# Patient Record
Sex: MALE | Race: BLACK OR AFRICAN AMERICAN | NOT HISPANIC OR LATINO | Employment: UNEMPLOYED | URBAN - METROPOLITAN AREA
[De-identification: names, ages, dates, MRNs, and addresses within clinical notes are randomized per-mention and may not be internally consistent; named-entity substitution may affect disease eponyms.]

---

## 2022-02-05 ENCOUNTER — HOSPITAL ENCOUNTER (INPATIENT)
Facility: CLINIC | Age: 32
LOS: 6 days | Discharge: HOME | End: 2022-02-14
Attending: EMERGENCY MEDICINE | Admitting: PSYCHIATRY & NEUROLOGY
Payer: COMMERCIAL

## 2022-02-05 DIAGNOSIS — F23 ACUTE PSYCHOSIS (H): ICD-10-CM

## 2022-02-05 DIAGNOSIS — E56.9 VITAMIN DEFICIENCY: Primary | ICD-10-CM

## 2022-02-05 PROCEDURE — 99285 EMERGENCY DEPT VISIT HI MDM: CPT | Mod: 25 | Performed by: EMERGENCY MEDICINE

## 2022-02-05 PROCEDURE — 99285 EMERGENCY DEPT VISIT HI MDM: CPT | Performed by: EMERGENCY MEDICINE

## 2022-02-05 PROCEDURE — 250N000013 HC RX MED GY IP 250 OP 250 PS 637: Performed by: EMERGENCY MEDICINE

## 2022-02-05 RX ORDER — OLANZAPINE 10 MG/1
10 TABLET, ORALLY DISINTEGRATING ORAL ONCE
Status: COMPLETED | OUTPATIENT
Start: 2022-02-05 | End: 2022-02-05

## 2022-02-05 RX ADMIN — OLANZAPINE 10 MG: 10 TABLET, ORALLY DISINTEGRATING ORAL at 17:38

## 2022-02-05 NOTE — ED TRIAGE NOTES
Per EMT report pt was at grocery store pacing and speaking to self about seeing the devil. Pt thinks he is a baby and also GOD. Pt is unable to track conversations.

## 2022-02-05 NOTE — ED PROVIDER NOTES
"ED Provider Note  [facility]      History     Chief Complaint   Patient presents with     Manic Behavior     Pt was at grocery store talking about being possessed by the devil and acting bizzare     HPI  Salvador Macario is a 31 year old male who was brought to the emergency department by EMS from [facility].  EMS got the call that there was an emotionally disturbed person who had been dropped off at a gas station and had been walking around talking to himself for the past hour or 2 and would not leave when staff asked him to leave.  According to EMS, he had stated that he is off of his medications and appeared to be responding to internal stimuli.  He was telling them that ducks and bunnies talk to him and that elves are real.  He began to get agitated when talking about the goddess of Mercy but was redirectable and they did not need to sedate her restrain him.  I am not able to get much history from the patient.  Patient seems very disorganized.  He tells me that he is a baby and that he is the superhero that we will save them all.  He also states that everything is a dream, the hospital is a dream and maybe the hospital is the place for him.  Patient did state to the nurse \"I am schizophrenic and I am off my meds\".    Past Medical History  No past medical history on file.  No past surgical history on file.  No current outpatient medications on file.    Not on File  Family History  No family history on file.  Social History   Social History     Tobacco Use     Smoking status: Not on file     Smokeless tobacco: Not on file   Substance Use Topics     Alcohol use: Not on file     Drug use: Not on file      Past medical history, past surgical history, medications, allergies, family history, and social history were reviewed with the patient. No additional pertinent items.       Review of Systems  A complete review of systems was attempted but limited due to Acute " psychosis.    Physical Exam   BP: (!) 156/89 (a lot of movement)  Pulse: 66  Temp: 97.2  F (36.2  C)  SpO2: 98 %  Physical Exam  Vitals and nursing note reviewed.   Constitutional:       General: He is not in acute distress.     Appearance: He is well-developed. He is not ill-appearing or toxic-appearing.   HENT:      Head: Normocephalic and atraumatic.   Eyes:      General: No scleral icterus.     Pupils: Pupils are equal, round, and reactive to light.   Cardiovascular:      Rate and Rhythm: Normal rate and regular rhythm.   Pulmonary:      Effort: Pulmonary effort is normal. No respiratory distress.   Musculoskeletal:      Cervical back: Normal range of motion and neck supple.   Skin:     General: Skin is warm and dry.      Coloration: Skin is not pale.      Findings: No rash.   Neurological:      Mental Status: He is alert and oriented to person, place, and time.      Sensory: No sensory deficit.   Psychiatric:         Mood and Affect: Affect is inappropriate.         Speech: Speech is tangential.         Behavior: Behavior is hyperactive.         Thought Content: Thought content is delusional.         Judgment: Judgment is impulsive and inappropriate.         ED Course      Procedures                     No results found for any visits on 02/05/22.  Medications   nicotine (NICORETTE) gum 2 mg (has no administration in time range)   nicotine (NICORETTE) gum 4 mg (4 mg Buccal Not Given 2/5/22 1952)   OLANZapine zydis (zyPREXA) ODT tab 10 mg (10 mg Oral Given 2/5/22 1738)        Assessments & Plan (with Medical Decision Making)     This patient presented to the emergency department acutely psychotic.  Thought content is quite tangential and he seems delusional to be responding to internal stimuli.  He is quite disorganized and seems unable to care for himself.  Patient was given 10 mg of Zyprexa orally at presentation and agitation calm and.  Patient then fell asleep and has yet to be assessed by the mental health  .  Plan at this point is for mental health assessment when appropriate.    I have reviewed the nursing notes. I have reviewed the findings, diagnosis, plan and need for follow up with the patient.    New Prescriptions    No medications on file       Final diagnoses:   Acute psychosis (H)       --  Michael MITCHELL [facility] EMERGENCY DEPARTMENT  2/5/2022     Michael Loo MD  02/05/22 5099

## 2022-02-05 NOTE — ED NOTES
Bed: ED14  Expected date: 2/5/22  Expected time: 4:45 PM  Means of arrival: Ambulance  Comments:  Jose 4560, 31yo male, delusional

## 2022-02-05 NOTE — ED NOTES
"Pt is actively responding to internal stimuli, he is having conversations with himself and gesticulating, pacing room. When asked if pt have thoughts of wanting to hurt self, pt replied \"No I have bad thoughts but I want to live\". Pt is not able to track conversations to answer admission questions. When asked about illicit drug use pt said he smokes cigarettes then stated \"I'm schizophrenic and I've been off my medications\". Pt did ate his meal tray and is requesting more snacks. Will offer prn zyprexa and snacks then re-attempt admission questions once pt is able to track for longer periods.   "

## 2022-02-06 ENCOUNTER — TELEPHONE (OUTPATIENT)
Dept: BEHAVIORAL HEALTH | Facility: CLINIC | Age: 32
End: 2022-02-06
Payer: COMMERCIAL

## 2022-02-06 PROCEDURE — 250N000011 HC RX IP 250 OP 636: Performed by: EMERGENCY MEDICINE

## 2022-02-06 PROCEDURE — 250N000013 HC RX MED GY IP 250 OP 250 PS 637: Performed by: EMERGENCY MEDICINE

## 2022-02-06 PROCEDURE — 90791 PSYCH DIAGNOSTIC EVALUATION: CPT

## 2022-02-06 PROCEDURE — 96374 THER/PROPH/DIAG INJ IV PUSH: CPT | Performed by: EMERGENCY MEDICINE

## 2022-02-06 RX ORDER — DIPHENHYDRAMINE HYDROCHLORIDE 50 MG/ML
50 INJECTION INTRAMUSCULAR; INTRAVENOUS ONCE
Status: COMPLETED | OUTPATIENT
Start: 2022-02-06 | End: 2022-02-06

## 2022-02-06 RX ORDER — OLANZAPINE 10 MG/1
10 TABLET, ORALLY DISINTEGRATING ORAL ONCE
Status: COMPLETED | OUTPATIENT
Start: 2022-02-06 | End: 2022-02-06

## 2022-02-06 RX ORDER — ACETAMINOPHEN 500 MG
1000 TABLET ORAL ONCE
Status: COMPLETED | OUTPATIENT
Start: 2022-02-06 | End: 2022-02-06

## 2022-02-06 RX ADMIN — OLANZAPINE 10 MG: 10 TABLET, ORALLY DISINTEGRATING ORAL at 08:36

## 2022-02-06 RX ADMIN — NICOTINE POLACRILEX 2 MG: 2 GUM, CHEWING ORAL at 23:51

## 2022-02-06 RX ADMIN — DIPHENHYDRAMINE HYDROCHLORIDE 50 MG: 50 INJECTION, SOLUTION INTRAMUSCULAR; INTRAVENOUS at 09:15

## 2022-02-06 RX ADMIN — ACETAMINOPHEN 1000 MG: 500 TABLET ORAL at 08:36

## 2022-02-06 RX ADMIN — NICOTINE POLACRILEX 4 MG: 2 GUM, CHEWING ORAL at 08:39

## 2022-02-06 NOTE — PROGRESS NOTES
Salvador Macario is reviewed for [facility] service. Will follow and meet with patient/family/care team as able or requested.     Elise Card, [facility], [facility] Extended [facility]   [phone]

## 2022-02-06 NOTE — SAFE
Salvador Macario  February 6, 2022  SAFE Note    Critical Safety Issues: psychosis      Current Suicidal Ideation/Self-Injurious Concerns/Methods: None - N/A      Current or Historical Inappropriate Sexual Behavior: No      Current or Historical Aggression/Homicidal Ideation: Agitation/Hyperactivity and Impaired Self-Control      Triggers: NA    Updated care team: Yes: MD, RN, intake.    For additional details see full [facility] assessment.       FIOR Vasquez

## 2022-02-06 NOTE — ED NOTES
[facility] ED Mental Health Handoff Note:       Brief HPI:  This is a 31 year old male signed out to me by Dr. Loo.  See initial ED Provider note for full details of the presentation. Patient presenting with disorganized behavior, delusions. Hx schizophrneia. Interval history is pertinent for given olanzapine, now sleeping and awaiting DEC assessment.    Home meds reviewed and ordered/administered: No    Medically stable for inpatient mental health admission: Yes.    Evaluated by mental health: No. Awaiting clinical sobriety before evaluation. clearing sedating effects of olanzapine.     Safety concerns: At the time I received sign out, there were no safety concerns.    Hold Status:  Active Orders   N/A       Exam:   Patient Vitals for the past 24 hrs:   BP Temp Temp src Pulse SpO2   02/05/22 1655 (!) 156/89 97.2  F (36.2  C) Oral 66 98 %       ED Course:    Medications   nicotine (NICORETTE) gum 2 mg (has no administration in time range)   nicotine (NICORETTE) gum 4 mg (4 mg Buccal Not Given 2/5/22 1952)   OLANZapine zydis (zyPREXA) ODT tab 10 mg (10 mg Oral Given 2/5/22 1738)            There were no significant events during my shift.  Patient slept through the night.    Patient was signed out to the oncoming provider, with plan for DEC behavioral health assessment, dispo pending DEC recommendations.      Impression:    ICD-10-CM    1. Acute psychosis (H)  F23        Plan:    1. Awaiting mental health evaluation/recommendations.      RESULTS:   No results found for this visit on 02/05/22 (from the past 24 hour(s)).          MD Jules Pelayo Barrett Parker, MD  02/06/22 0701

## 2022-02-06 NOTE — ED NOTES
Client coming out of room multiple times to ask for food, client responds well to redirection , though client seems increasingly agitated over not having breakfast tray and wanting to smoke. Client verbally responding to internal stimulation, pacing in hallway and room, conversing with himself. Client is currently safe. No aggressive behavior towards self or staff.

## 2022-02-06 NOTE — ED NOTES
2/5/2022  Salvador Macario 1990     [facility] Crisis Assessment    Patient was assessed: in person  Patient location: [facility] Emergency Department       Referral Data and Chief Complaint  Salvador is a 31 year old who uses he/him pronouns. Patient presented to the ED via EMS and was referred to the ED by community provider(s). Patient is presenting to the ED for the following concerns: altered mental state.         Informed Consent and Assessment Methods    Patient is his own guardian. Writer met with patient and explained the crisis assessment process, including applicable information disclosures and limits to confidentiality, assessed understanding of the process, and obtained consent to proceed with the assessment. Patient was observed to be able to participate in the assessment as evidenced by Patient is not able to participate. . Assessment methods included conducting a formal interview with patient, review of medical records, collaboration with medical staff, and obtaining relevant collateral information from family and community providers when available.    Narrative Summary of Presenting Problem and Current Functioning  What led to the patient presenting for crisis services, factors that make the crisis life threatening or complex, stressors, how is this disrupting the patient's life, and how current functioning is in comparison to baseline. How is patient presenting during the assessment.     Per EMT report pt was [place] [employer] pacing and speaking to self about seeing the devil. Pt thinks he is a baby and also GOD. Pt is unable to track conversations. Harlan Peña, RN    Patient is not oriented to self, place or date. His thought process slow and he is unable to answer any questions. He demonstrates thought blocking, responding to internal stimuli and delusional thinking. He admits to having a mental health history and when asked what medications he takes he  "responded \"cigarettes.\" Patient has made statements about being God to others, he is restless, and slightly agitated. He is out of touch with reality. Disorganized. He is not able to participate in any mental health assessment.     History of the Crisis  Duration of the current crisis, coping skills attempted to reduce the crisis, community resources used, and past presentations.    Per chart patient has a history of schizoaffective disorder, bipolar type. [facility] His mother states his medications were changed in December and patient was not able to manage the transition to the new medications.  He had a therapist he saw once in 12/21. He was placed on a stay of commitment 6/21 at [facility] which has closed and per his mother they are no longer involved. His last admission to inpatient mental health appears to be 6/21 at [facility]. Mother reports he has had 7 + mental health admissions in various states, 5 in the last 2 years.     Collateral Information    Patient's mother Asha Maya [phone] was contacted for collateral information. She last spoke to patient yesterday but at that time he did not want to talk to her and hung up. She states she saw he was declining with his mental health and suspects that he has been non compliant with his medications, as this is his pattern. When he is on his medications he does well. She states he does not like to take his medications, thought he reported the last medication change in December he commented that the new medications were better for him, more himself. He has not been attending appointments Since his commitment case management ended she has been trying to help him but become exhausted and he gets defensive saying he can manage everything. Mother reports patient lives alone. He has a hard time functioning, especially in regards to financial management. He gets social security disability and spends it mostly in the " first week of the month, seeking food from her, and racking up debt. He told her he started a job at [employer] last week. Mother would like to see patient court ordered to a living environment where he has better management of his medications and mental health.     Risk Assessment    Risk of Harm to Self     ESS-6   1.a. Over the past 2 weeks, have you had thoughts of killing yourself? Patient unable to answer.   1.b. Have you ever attempted to kill yourself and, if yes, when did this last happen?  Patient unable to answer.   2. Recent or current suicide plan? No per nurses note  3. Recent or current intent to act on ideation? No  4. Lifetime psychiatric hospitalization? Yes  5. Pattern of excessive substance use? No  6. Current irritability, agitation, or aggression? Yes  Scoring note: BOTH 1a and 1b must be yes for it to score 1 point, if both are not yes it is zero. All others are 1 point per number. If all questions 1a/1b - 6 are no, risk is negligible. If one of 1a/1b is yes, then risk is mild. If either question 2 or 3, but not both, is yes, then risk is automatically moderate regardless of total score. If both 2 and 3 are yes, risk is automatically high regardless of total score.     Score: 2, moderate risk    The patient has the following risk factors for suicide: active  or , isolation, lack of support, poor decision making, psychosis, significant behavioral changes and restless/agitated    Is the patient experiencing current suicidal ideation: No    Is the patient engaging in preparatory suicide behaviors (formulating how to act on plan, giving away possessions, saying goodbye, displaying dramatic behavior changes, etc)? No    Does the patient have access to firearms or other lethal means? Unknown. Patient unable to answer.     The patient has the following protective factors: displays resiliency , established relationship community mental health provider(s) and safe/stable housing    Support  system information: Mother. [facility] staff    Patient strengths: Patient does well when on medications per his mother. He has attended college for 7-8 years and reports he has one class to completed.     Does the patient engage in non-suicidal self-injurious behavior (NSSI/SIB)? no    Is the patient vulnerable to sexual exploitation?  No    Is the patient experiencing abuse or neglect? no    Is the patient a vulnerable adult? No      Risk of Harm to Others  The patient has the following risk factors of harm to others: no risk factors identified    Does the patient have thoughts of harming others? No    Is the patient engaging in sexually inappropriate behavior?  no       Current Substance Abuse    Is there recent substance abuse? no Patient unable to answer.     Was a urine drug screen or blood alcohol level obtained: Yes pending    Current Symptoms/Concerns    Symptoms  Attention, hyperactivity, and impulsivity symptoms present: Yes: Disorganized/Forgetful and Restless    Anxiety symptoms present: No      Appetite symptoms present: No     Behavioral difficulties present: Yes: Agitation and Wandering     Cognitive impairment symptoms present: No    Depressive symptoms present: Patient unable to answer.     Eating disorder symptoms present: Patient unable to answer.     Learning disabilities, cognitive challenges, and/or developmental disorder symptoms present: No     Manic/hypomanic symptoms present: Yes Increased irritability/agitation    Personality and interpersonal functioning difficulties present : No    Psychosis symptoms present: Yes: Hallucinations: Auditory and Visual, Delusions: Ideas of Reference: being God and a baby. He sees the devil. and Paranoid: NA and Grossly Disorganized Speech      Sleep difficulties present: No    Substance abuse disorder symptoms present: No     Trauma and stressor related symptoms present: No           Mental Status Exam   Affect: Blunted and Labile   Appearance: Appropriate     Attention Span/Concentration: Inattentive?    Eye Contact: Intense and Variable   Fund of Knowledge: Delayed    Language /Speech Content: Fluent   Language /Speech Volume: Normal    Language /Speech Rate/Productions: Minimally Responsive    Recent Memory: Poor   Remote Memory: Poor   Mood: Anxious and Irritable    Orientation to Person: No    Orientation to Place: No   Orientation to Time of Day: No    Orientation to Date: No    Situation (Do they understand why they are here?): No    Psychomotor Behavior: Agitated    Thought Content: Delusions and Hallucinations   Thought Form: Other: thought blocking       Mental Health and Substance Abuse History    History  Current and historical diagnoses or mental health concerns: Schizoaffective disorder, bipolar type. Generalized anxiety disorder. Cannabis use disorder. Alcohol use disorder.     Prior MH services (inpatient, programmatic care, outpatient, etc) : Yes His mother reports he has had 7+ prior mental health admissions in [facility], [place], [facility] [place]. The most recent was [facility] 6/2021. [facility] He does not have any other providers at this time.     History of substance abuse: Alcohol and marijuana.     Prior ELICIA services (inpatient, programmatic care, detox, outpatient, etc) : Patient unable to answer.     History of commitment: Yes Stay of commitment 6/23/2021. Closed.    Family history of MH/ELICIA:  Per [facility]   Sister with bipolar  Maternal great-grandmother: schizophrenia  Mother: undiagnosed  Biological family members with suicide: none  Biological family members with substance abuse: none       Trauma history: Yes Patient was in the  service deployed in [place]. He was honorably discharged in 2013 due to mental illness. [facility]     Medication  Psychotropic medications: No    Current Care Team  [facility] Provider: [facility]      Psychiatrist: Dr. El, [facility]. [phone].      Release of Information  Was a release of information signed: No. Reason: psychosis.      Biopsychosocial Information    Socioeconomic Information  Current living situation: Patient lives alone.     Employment/income source: Patient is on social security disability. 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     Relevant legal issues: None    Cultural, Anglican, or spiritual influences on mental health care: NA    Is the patient active in the  or a : Yes, patient is connected with  specific resources      Relevant Medical Concerns   Patient identifies concerns with completing ADLs? No     Patient can ambulate independently? Yes     Other medical concerns? No     History of concussion or TBI? No        Diagnosis    295.70  (F25) Schizoaffective Disorder Bipolar Type - by history     Therapeutic Intervention  The following therapeutic methodologies were employed when working with the patient: establishing rapport, assessing dimensions of crisis and identifying additional supports and alternative coping skills.       Disposition  Recommended disposition: Inpatient Mental Health      Reviewed case and recommendations with attending provider. Attending Name: Dr. Appiah      Attending concurs with disposition: Yes      Patient concurs with disposition:   Patient unable to answer.     Guardian concurs with disposition: NA     Final disposition: Inpatient mental health .     Inpatient Details (if applicable):  Is patient admitted voluntarily:No, 72 hr hold. Hold start date/time: 2/6/2022 8:11 am    Patient aware of potential for transfer if there is not appropriate placement? NA     [facility]? NA      Behavioral Intake Notified? Yes: Date: 2/6/22 Time: 8:30 am      Clinical Substantiation of Recommendations   Rationale with supporting factors for  disposition and diagnosis.     Patient presents to the ED with symptoms consistent with his schizoaffective disorder, bipolar type. He is having delusions, thought blocking, loss touch of reality, restlessness, disorganized thoughts, and inability to participate in any assessment. His symptoms are consistent with what his mother describes happens when he stops taking his medications. He is at risk of harm to himself and will be admitted to inpatient mental health on a 72 hour hold.        Assessment Details  Patient interview started at: 8:10 and completed at: 8:40.    Total duration spent on the patient case in minutes: 1.0 hrs     CPT code(s) utilized: 84530 - Psychotherapy for Crisis - 60 (30-74*) min     FIOR Vasquez

## 2022-02-06 NOTE — ED NOTES
"Writer re-approached pt to complete admission but pt was becoming agitated and stated \"I don't like to look into people eyes,stop talking\"   "

## 2022-02-06 NOTE — ED NOTES
9:00 pt was pacing smith and redirected to walk in short smith by 16a/b.    Pt make fist-closed punch/ fighting gestures to staff and exit door    Pt redirected to room, chair removed from room to allow more walking space within room    Pt provided with warm blanket and water

## 2022-02-06 NOTE — ED NOTES
Psych Assoc. (writer) rounded on pt at 8am, provided juice, banana, water and distraction activities (coloring book) and TV remote.    Pt stated he does not know what his care plan is..    Psych Assoc (writer) informed nurse the pt would like to know his care plan.    At 8:10 Nurse provided a sandwich and milk    At 8:15  Pt escalated and threatened to punch Psych Assciate    At 8:20 Pt is pacing in smith outside smith claiming he's god and giving everyone supernatural etienne.    8:30 Psych Assoc.(writer) and nurse able to redirect pt to room.

## 2022-02-06 NOTE — ED NOTES
SIGN-OUT:  - Assumed care of this patient from Dr. Vicente  - Pending at shift change: BEC assessment  - Tentative plan from original EM Physician: Needs BEC Assessment, likely admit    UPDATES / REASSESSMENT:    - Reassessment:   --On reassessment patient was cooperative but a poor historian given his underlying mental health.  Patient was commenting about how he was already dead, how he was God, how he was a baby.  Patient had thought blocking, was not responding to internal stimuli.  Unable to obtain an accurate history.  Per chart review patient has previously been under commitment for psychosis, as recently as last June.  Collaborative history was obtained through the medical records and from his family.  Please see BEC assessment from today for full details.  Patient was denying any medical complaints, had stable vital signs, had a grossly normal physical neurologic exam on my reassessment.    DISCUSSIONS:  - w/ BEC .  Patient was felt to be acutely psychotic and a danger to self unable to make clear or safe decisions.  Patient was placed on a 72-hour hold and will be admitted to the mental health unit.  Patient at times appeared somewhat agitated but could be verbally redirected.  Did give 10 of oral Zyprexa.  No need for chemical restraints, seclusion or physical restraints while in the emergency department.  He is still awaiting mental health placement.  He was signed out to Dr. Rodas who will follow up until a bed is available.    DISPOSITION:  - IMPRESSION: Psychosis   - DISPOSITION: admission to inpatient on a 72-hour hold      MD Bijal Howell, Norris Mullins MD  02/08/22 0982

## 2022-02-07 ENCOUNTER — TELEPHONE (OUTPATIENT)
Dept: BEHAVIORAL HEALTH | Facility: CLINIC | Age: 32
End: 2022-02-07

## 2022-02-07 LAB
AMPHETAMINES UR QL SCN: NORMAL
BARBITURATES UR QL: NORMAL
BENZODIAZ UR QL: NORMAL
CANNABINOIDS UR QL SCN: NORMAL
COCAINE UR QL: NORMAL
OPIATES UR QL SCN: NORMAL
SARS-COV-2 RNA RESP QL NAA+PROBE: NEGATIVE

## 2022-02-07 PROCEDURE — 250N000013 HC RX MED GY IP 250 OP 250 PS 637: Performed by: EMERGENCY MEDICINE

## 2022-02-07 PROCEDURE — U0003 INFECTIOUS AGENT DETECTION BY NUCLEIC ACID (DNA OR RNA); SEVERE ACUTE RESPIRATORY SYNDROME CORONAVIRUS 2 (SARS-COV-2) (CORONAVIRUS DISEASE [COVID-19]), AMPLIFIED PROBE TECHNIQUE, MAKING USE OF HIGH THROUGHPUT TECHNOLOGIES AS DESCRIBED BY CMS-2020-01-R: HCPCS | Performed by: EMERGENCY MEDICINE

## 2022-02-07 PROCEDURE — 80307 DRUG TEST PRSMV CHEM ANLYZR: CPT | Performed by: EMERGENCY MEDICINE

## 2022-02-07 RX ORDER — OLANZAPINE 10 MG/1
10 TABLET, ORALLY DISINTEGRATING ORAL ONCE
Status: COMPLETED | OUTPATIENT
Start: 2022-02-07 | End: 2022-02-07

## 2022-02-07 RX ADMIN — NICOTINE POLACRILEX 2 MG: 2 GUM, CHEWING ORAL at 15:38

## 2022-02-07 RX ADMIN — NICOTINE POLACRILEX 2 MG: 2 GUM, CHEWING ORAL at 17:54

## 2022-02-07 RX ADMIN — NICOTINE POLACRILEX 2 MG: 2 GUM, CHEWING ORAL at 06:00

## 2022-02-07 RX ADMIN — NICOTINE POLACRILEX 2 MG: 2 GUM, CHEWING ORAL at 13:24

## 2022-02-07 RX ADMIN — OLANZAPINE 10 MG: 10 TABLET, ORALLY DISINTEGRATING ORAL at 07:03

## 2022-02-07 NOTE — ED NOTES
Patient reports it is okay to talk to his mom. His mom would like to be notified when patient gets admitted, patient is his own guardian, would have to okay this with the patient when the patient transfers. Mom is Asha [phone]

## 2022-02-07 NOTE — ED NOTES
Patient having increased restlessness and difficulty staying in room. Patient remains pleasant, but has difficulty organizing thoughts.

## 2022-02-07 NOTE — ED NOTES
[facility] ED Mental Health Handoff Note:       Brief HPI:  This is a 31 year old male signed out to me by Dr. Aquino.  See initial ED Provider note for full details of the presentation. Interval history is pertinent for no acute issues.    Home meds reviewed and ordered/administered: Yes    Medically stable for inpatient mental health admission: Yes.    Evaluated by mental health: Yes. The recommendation is for inpatient mental health treatment. Bed search in process    Safety concerns: At the time I received sign out, there were no safety concerns.    Hold Status:  Active Orders   Legal    Emergency Hospitalization Hold (72 Hr Hold)     Frequency: Effective Now     Start Date/Time: 02/06/22 0812      Number of Occurrences: Until Specified            Exam:   Patient Vitals for the past 24 hrs:   BP Temp Temp src Pulse Resp SpO2   02/07/22 0730 122/74 98.7  F (37.1  C) Oral 82 18 97 %   02/07/22 0611 (!) 141/90 98.4  F (36.9  C) Oral 102 16 97 %   02/06/22 1821 131/69 98.6  F (37  C) Oral 69 16 94 %           ED Course:    Medications   nicotine (NICORETTE) gum 2 mg (2 mg Buccal Given 2/7/22 1324)   OLANZapine zydis (zyPREXA) ODT tab 10 mg (10 mg Oral Given 2/5/22 1738)   nicotine (NICORETTE) gum 4 mg (4 mg Buccal Given 2/6/22 0839)   OLANZapine zydis (zyPREXA) ODT tab 10 mg (10 mg Oral Given 2/6/22 0836)   acetaminophen (TYLENOL) tablet 1,000 mg (1,000 mg Oral Given 2/6/22 0836)   diphenhydrAMINE (BENADRYL) injection 50 mg (50 mg Intravenous Given 2/6/22 0915)   OLANZapine zydis (zyPREXA) ODT tab 10 mg (10 mg Oral Given 2/7/22 0703)            There were no significant events during my shift.    Patient was signed out to the oncoming provider, Dr. Lraa      Impression:    ICD-10-CM    1. Acute psychosis (H)  F23        Plan:    1. Awaiting inpatient mental health admission/transfer.      RESULTS:   Results for orders placed or performed during the hospital encounter of 02/05/22 (from the past 24  hour(s))   Urine Drugs of Abuse Screen     Status: Normal    Collection Time: 02/07/22  6:02 AM    Narrative    The following orders were created for panel order Urine Drugs of Abuse Screen.  Procedure                               Abnormality         Status                     ---------                               -----------         ------                     Drug abuse screen 1 urin...[[id number]]  Normal              Final result                 Please view results for these tests on the individual orders.   Drug abuse screen 1 urine (ED)     Status: Normal    Collection Time: 02/07/22  6:02 AM   Result Value Ref Range    Amphetamines Urine Screen Negative Screen Negative    Barbiturates Urine Screen Negative Screen Negative    Benzodiazepines Urine Screen Negative Screen Negative    Cannabinoids Urine Screen Negative Screen Negative    Cocaine Urine Screen Negative Screen Negative    Opiates Urine Screen Negative Screen Negative   Asymptomatic COVID-19 Virus (Coronavirus) by PCR Nasopharyngeal     Status: Normal    Collection Time: 02/07/22  6:09 AM    Specimen: Nasopharyngeal; Swab   Result Value Ref Range    SARS CoV2 PCR Negative Negative    Narrative    Testing was performed using the miri  SARS-CoV-2 & Influenza A/B Assay on the miri  Inés  System.  This test should be ordered for the detection of SARS-COV-2 in individuals who meet SARS-CoV-2 clinical and/or epidemiological criteria. Test performance is unknown in asymptomatic patients.  This test is for in vitro diagnostic use under the FDA EUA for laboratories certified under CLIA to perform moderate and/or high complexity testing. This test has not been FDA cleared or approved.  A negative test does not rule out the presence of PCR inhibitors in the specimen or target RNA in concentration below the limit of detection for the assay. The possibility of a false negative should be considered if the patient's recent exposure or clinical presentation  suggests COVID-19.  [facility] are certified under the Clinical Laboratory Improvement Amendments of 1988 (CLIA-88) as qualified to perform moderate and/or high complexity laboratory testing.             Kristina Watts MD    Signed:  Kristina Watts MD  February 7, 2022 at 2:17 PM                     Kristina Watts MD  02/07/22 1417

## 2022-02-07 NOTE — TELEPHONE ENCOUNTER
9348 Bed Search Update:    0333 Pt is a .  [facility] after [facility] has a negative COVID and resulted drug screen.  Once labs have resulted, Clinical will be faxed to [phone].  ED Charge notified about labs via text page at 4063. 7584 Clinical and labs have been [facility] to the [facility] for review.  Case will be passed to days to track.

## 2022-02-07 NOTE — TELEPHONE ENCOUNTER
R:  10 AM  Per [facility], they have no available beds for patient.  He will remain on the worklist until placement found.

## 2022-02-07 NOTE — ED NOTES
Patient sleeping at this time. Prior nurse reports patient refusing Covid test and UA. Writer with re approach when patient is awake.

## 2022-02-07 NOTE — ED NOTES
Pt is currently boarding in the ED.  Pt was offered hygiene supplies: Yes. Patient currently performing ADL's   Pt was offered to ambulate on unit with staff: Yes.  Meal tray ordered for pt: Yes.

## 2022-02-07 NOTE — ED NOTES
"Triage & Transition Services     [facility] Crisis Reassessment    Salvador Macario was reassessed for the following reasons: boarding in ED for 46+ hours, awaiting inpatient admission. Patient presented on 2022 with concerns for altered mental status. He was assessed by SUSANNE Cook on 2022 with recommendations for inpatient admission. Please see the initial assessment note for additional details. He is currently on a 72 HH which will  2/10/2022 at 0001.     Initial ED presentation details: Patient presented via EMS and was disoriented, and exhibiting psychosis.     Current patient presentation: Patient was asleep during writer's first attempt at reassessment, but woke up shortly thereafter, and agreed to meeting with writer. He stated that he did not know where he was, but was able to identify himself and provide the correct date (2022). He believes that he was found by a gas station, likely \"acting erratically\" and was brought to the ED. He then asked if he hurt anyone. He reported that he stopped taking his Lamictal about a week ago for no particular reason. He states that he feels suicidal \"all the time,\" but denies any active thoughts. He endorsed history of suicide attempts, but could not recall the details. He denied homicidal ideation. He provided an ambiguous response to whether or not he is experiencing auditory or visual hallucinations, though did not appear to be responding to internal stimuli. He denied drug or alcohol use. He states that he doesn't want to be a failure, and feels like he is failing everything. He states that he is \"not right in the head,\" and says that he would like to be in the hospital. He states that he is at risk of seizures due to discontinuation of Lamictal.     Changes observed since initial assessment: Now oriented to self, situation and date, able to participate in reassessment, improvement noted to initial psychotic symptoms.     Risk of " Harm  Current suicidal ideation: No    Thoughts of harming others: No    Mental Status Exam   Appearance: alert, somnolent and cooperative  Attitude: cooperative  Eye Contact: good  Mood: indifferent  Affect: intensity is flat and fixed mobility  Speech: clear, coherent  Psychomotor Behavior: no evidence of tardive dyskinesia, dystonia, or tics  Thought Process:  logical  Associations: no loose associations  Thought Content: no evidence of suicidal ideation or homicidal ideation and no evidence of psychotic thought  Insight: fair  Judgement: fair  Oriented to: time, person, situation  Attention Span and Concentration: fair  Recent and Remote Memory: fair    Additional Collateral Information   None obtained, though bedside RN had a lengthy conversation with mother, who expressed desire for civil commitment due to patient's history of noncompliance resulting in choices that put him at risk of harm.     Therapeutic Intervention  The following therapeutic methodologies were employed when working with the patient: Establishing rapport, Active listening and Assess dimensions of crisis. Patient response to intervention: open, engaged.    Disposition  Recommended disposition: Inpatient Mental Health      Reviewed case and recommendations with attending provider: Dr. Acevedo      Attending concurs with disposition: Yes      Patient concurs with disposition: Yes      Final disposition: Inpatient mental health .     Clinical Substantiation of Recommendations  Patient reports that he has been off his Lamictal for more than a week leading to decompensation. He is requesting psychiatric admission for safety and stabilization purposes. He continues to exhibit altered mental state and would be unable to care for himself in the community in his current state.     Assessment Details  Total duration spent on the patient case in minutes: .50 hrs     CPT code(s) utilized: 36826 - Psychotherapy (with patient) - 30 (16-37*) min     April  Waldemar, CLAIRSW

## 2022-02-08 PROBLEM — F23 ACUTE PSYCHOSIS (H): Status: ACTIVE | Noted: 2022-02-08

## 2022-02-08 LAB
ALBUMIN SERPL-MCNC: 3.4 G/DL (ref 3.4–5)
ALP SERPL-CCNC: 44 U/L (ref 40–150)
ALT SERPL W P-5'-P-CCNC: 41 U/L (ref 0–70)
ANION GAP SERPL CALCULATED.3IONS-SCNC: 4 MMOL/L (ref 3–14)
AST SERPL W P-5'-P-CCNC: 16 U/L (ref 0–45)
BASOPHILS # BLD AUTO: 0.1 10E3/UL (ref 0–0.2)
BASOPHILS NFR BLD AUTO: 1 %
BILIRUB SERPL-MCNC: 0.3 MG/DL (ref 0.2–1.3)
BUN SERPL-MCNC: 19 MG/DL (ref 7–30)
CALCIUM SERPL-MCNC: 8.8 MG/DL (ref 8.5–10.1)
CHLORIDE BLD-SCNC: 108 MMOL/L (ref 94–109)
CHOLEST SERPL-MCNC: 161 MG/DL
CO2 SERPL-SCNC: 28 MMOL/L (ref 20–32)
CREAT SERPL-MCNC: 0.79 MG/DL (ref 0.66–1.25)
EOSINOPHIL # BLD AUTO: 0.5 10E3/UL (ref 0–0.7)
EOSINOPHIL NFR BLD AUTO: 9 %
ERYTHROCYTE [DISTWIDTH] IN BLOOD BY AUTOMATED COUNT: 11.3 % (ref 10–15)
FOLATE SERPL-MCNC: 16.7 NG/ML
GFR SERPL CREATININE-BSD FRML MDRD: >90 ML/MIN/1.73M2
GLUCOSE BLD-MCNC: 98 MG/DL (ref 70–99)
HCT VFR BLD AUTO: 41.2 % (ref 40–53)
HDLC SERPL-MCNC: 40 MG/DL
HGB BLD-MCNC: 13.4 G/DL (ref 13.3–17.7)
IMM GRANULOCYTES # BLD: 0 10E3/UL
IMM GRANULOCYTES NFR BLD: 0 %
LDLC SERPL CALC-MCNC: 107 MG/DL
LYMPHOCYTES # BLD AUTO: 1.9 10E3/UL (ref 0.8–5.3)
LYMPHOCYTES NFR BLD AUTO: 32 %
MCH RBC QN AUTO: 29.8 PG (ref 26.5–33)
MCHC RBC AUTO-ENTMCNC: 32.5 G/DL (ref 31.5–36.5)
MCV RBC AUTO: 92 FL (ref 78–100)
MONOCYTES # BLD AUTO: 0.5 10E3/UL (ref 0–1.3)
MONOCYTES NFR BLD AUTO: 8 %
NEUTROPHILS # BLD AUTO: 3 10E3/UL (ref 1.6–8.3)
NEUTROPHILS NFR BLD AUTO: 50 %
NONHDLC SERPL-MCNC: 121 MG/DL
NRBC # BLD AUTO: 0 10E3/UL
NRBC BLD AUTO-RTO: 0 /100
PLATELET # BLD AUTO: 319 10E3/UL (ref 150–450)
POTASSIUM BLD-SCNC: 4.2 MMOL/L (ref 3.4–5.3)
PROT SERPL-MCNC: 6.8 G/DL (ref 6.8–8.8)
RBC # BLD AUTO: 4.5 10E6/UL (ref 4.4–5.9)
SODIUM SERPL-SCNC: 140 MMOL/L (ref 133–144)
T4 FREE SERPL-MCNC: 0.79 NG/DL (ref 0.76–1.46)
TRIGL SERPL-MCNC: 68 MG/DL
TSH SERPL DL<=0.005 MIU/L-ACNC: 0.38 MU/L (ref 0.4–4)
VIT B12 SERPL-MCNC: 532 PG/ML (ref 193–986)
WBC # BLD AUTO: 5.9 10E3/UL (ref 4–11)

## 2022-02-08 PROCEDURE — 124N000002 HC R&B MH UMMC

## 2022-02-08 PROCEDURE — 85014 HEMATOCRIT: CPT | Performed by: STUDENT IN AN ORGANIZED HEALTH CARE EDUCATION/TRAINING PROGRAM

## 2022-02-08 PROCEDURE — 84439 ASSAY OF FREE THYROXINE: CPT | Performed by: STUDENT IN AN ORGANIZED HEALTH CARE EDUCATION/TRAINING PROGRAM

## 2022-02-08 PROCEDURE — 80061 LIPID PANEL: CPT | Performed by: STUDENT IN AN ORGANIZED HEALTH CARE EDUCATION/TRAINING PROGRAM

## 2022-02-08 PROCEDURE — 250N000013 HC RX MED GY IP 250 OP 250 PS 637: Performed by: STUDENT IN AN ORGANIZED HEALTH CARE EDUCATION/TRAINING PROGRAM

## 2022-02-08 PROCEDURE — 36415 COLL VENOUS BLD VENIPUNCTURE: CPT | Performed by: STUDENT IN AN ORGANIZED HEALTH CARE EDUCATION/TRAINING PROGRAM

## 2022-02-08 PROCEDURE — 82746 ASSAY OF FOLIC ACID SERUM: CPT | Performed by: STUDENT IN AN ORGANIZED HEALTH CARE EDUCATION/TRAINING PROGRAM

## 2022-02-08 PROCEDURE — 80053 COMPREHEN METABOLIC PANEL: CPT | Performed by: STUDENT IN AN ORGANIZED HEALTH CARE EDUCATION/TRAINING PROGRAM

## 2022-02-08 PROCEDURE — 82607 VITAMIN B-12: CPT | Performed by: STUDENT IN AN ORGANIZED HEALTH CARE EDUCATION/TRAINING PROGRAM

## 2022-02-08 PROCEDURE — 99223 1ST HOSP IP/OBS HIGH 75: CPT | Mod: AI | Performed by: PSYCHIATRY & NEUROLOGY

## 2022-02-08 PROCEDURE — 84443 ASSAY THYROID STIM HORMONE: CPT | Performed by: STUDENT IN AN ORGANIZED HEALTH CARE EDUCATION/TRAINING PROGRAM

## 2022-02-08 PROCEDURE — 250N000013 HC RX MED GY IP 250 OP 250 PS 637: Performed by: EMERGENCY MEDICINE

## 2022-02-08 RX ORDER — OLANZAPINE 10 MG/2ML
10 INJECTION, POWDER, FOR SOLUTION INTRAMUSCULAR 3 TIMES DAILY PRN
Status: DISCONTINUED | OUTPATIENT
Start: 2022-02-08 | End: 2022-02-14 | Stop reason: HOSPADM

## 2022-02-08 RX ORDER — OLANZAPINE 10 MG/1
10 TABLET ORAL 3 TIMES DAILY PRN
Status: DISCONTINUED | OUTPATIENT
Start: 2022-02-08 | End: 2022-02-14 | Stop reason: HOSPADM

## 2022-02-08 RX ORDER — ACETAMINOPHEN 325 MG/1
650 TABLET ORAL EVERY 4 HOURS PRN
Status: DISCONTINUED | OUTPATIENT
Start: 2022-02-08 | End: 2022-02-14 | Stop reason: HOSPADM

## 2022-02-08 RX ORDER — VITAMIN B COMPLEX
25 TABLET ORAL DAILY
Status: DISCONTINUED | OUTPATIENT
Start: 2022-02-08 | End: 2022-02-14 | Stop reason: HOSPADM

## 2022-02-08 RX ORDER — HYDROXYZINE HYDROCHLORIDE 25 MG/1
25 TABLET, FILM COATED ORAL EVERY 4 HOURS PRN
Status: DISCONTINUED | OUTPATIENT
Start: 2022-02-08 | End: 2022-02-14 | Stop reason: HOSPADM

## 2022-02-08 RX ORDER — POLYETHYLENE GLYCOL 3350 17 G/17G
17 POWDER, FOR SOLUTION ORAL DAILY PRN
Status: DISCONTINUED | OUTPATIENT
Start: 2022-02-08 | End: 2022-02-14 | Stop reason: HOSPADM

## 2022-02-08 RX ORDER — OLANZAPINE 10 MG/1
10 TABLET ORAL DAILY
Status: DISCONTINUED | OUTPATIENT
Start: 2022-02-08 | End: 2022-02-09

## 2022-02-08 RX ORDER — TRAZODONE HYDROCHLORIDE 50 MG/1
50 TABLET, FILM COATED ORAL
Status: DISCONTINUED | OUTPATIENT
Start: 2022-02-08 | End: 2022-02-14 | Stop reason: HOSPADM

## 2022-02-08 RX ADMIN — Medication 25 MCG: at 08:29

## 2022-02-08 RX ADMIN — NICOTINE POLACRILEX 2 MG: 2 GUM, CHEWING ORAL at 12:26

## 2022-02-08 RX ADMIN — NICOTINE POLACRILEX 2 MG: 2 GUM, CHEWING ORAL at 11:06

## 2022-02-08 RX ADMIN — OLANZAPINE 10 MG: 10 TABLET, FILM COATED ORAL at 11:46

## 2022-02-08 ASSESSMENT — ACTIVITIES OF DAILY LIVING (ADL)
LAUNDRY: UNABLE TO COMPLETE
HYGIENE/GROOMING: INDEPENDENT
ORAL_HYGIENE: INDEPENDENT
DRESS: SCRUBS (BEHAVIORAL HEALTH);PROMPTS

## 2022-02-08 NOTE — PLAN OF CARE
Pt slept for a total of 2.5 hours during this overnight shift. No PRN medications were given and no concerns were noted.     Problem: Adult Inpatient Plan of Care  Goal: Readiness for Transition of Care  Intervention: Mutually Develop Transition Plan  Recent Flowsheet Documentation  Taken 2/8/2022 0152 by Maci Mixon  Equipment Currently Used at Home: none

## 2022-02-08 NOTE — PLAN OF CARE
Pt denies SI.  Pt asked if hearing voices.  Pt paused and did not answer.  Pt states he feels like living in a fantasy world and not a good one.  Pt does well when he's taking medication.  Pt enc to go to a group and pt walked in.  Staff told pt that he does not need to participate instead can sit and look out window.  Pt did not stay and participate in any groups except for community meeting. Pt paced halls majority of the day. Pt had limited responses to staff when asked to do something or asked questions. Pt required multiple promptings when asked questions. Pt slept through afternoon groups. Pt's mom called and asked about well being and asked if he was responding to staff. Staff relayed number to pt, but pt did not call them back.   Problem: Adult Inpatient Plan of Care  Goal: Plan of Care Review  Outcome: No Change  Goal: Patient-Specific Goal (Individualized)  Outcome: No Change  Goal: Absence of Hospital-Acquired Illness or Injury  Outcome: No Change  Goal: Optimal Comfort and Wellbeing  Outcome: No Change  Goal: Readiness for Transition of Care  Outcome: No Change     Problem: Behavioral Health Plan of Care  Goal: Plan of Care Review  Outcome: No Change  Goal: Patient-Specific Goal (Individualization)  Outcome: No Change  Note:     Goal: Adheres to Safety Considerations for Self and Others  Outcome: No Change  Goal: Absence of New-Onset Illness or Injury  Outcome: No Change  Goal: Optimized Coping Skills in Response to Life Stressors  Outcome: No Change  Goal: Develops/Participates in Therapeutic [place] to Support Successful Transition  Outcome: No Change

## 2022-02-08 NOTE — PROGRESS NOTES
02/08/22 0151   Patient Belongings   Did you bring any home meds/supplements to the hospital?  No   Patient Belongings locker;sent to security per site process   Patient Belongings Put in Hospital Secure Location (Security or Locker, etc.) cash/credit card;cell phone/electronics;clothing;keys;shoes;wallet   Belongings Search Yes   Clothing Search Yes   Second Staff Trell BARNES   IN LOCKER:  Shoes  Hat  Jacket  Sweatshirt  Black socks  Black shorts  Iphone w/case  Gold colored watch  Keys on lanyard  Lighter  Open pack of cigarettes  Purple fidget spinner  Wallet  Drivers License  Social Security Card    TO SECURITY:  [employer] Visa [phone]  [employer] Visa 1282  [employer] MasterCard [id number]   Visa [phone]  A               Admission:  I am responsible for any personal items that are not sent to the safe or pharmacy.  [facility] is not responsible for loss, theft or damage of any property in my possession.    Signature:  _________________________________ Date: _______  Time: _____                                              Staff Signature:  ____________________________ Date: ________  Time: _____      2nd Staff person, if patient is unable/unwilling to sign:    Signature: ________________________________ Date: ________  Time: _____     Discharge:  [facility] has returned all of my personal belongings:    Signature: _________________________________ Date: ________  Time: _____                                          Staff Signature:  ____________________________ Date: ________  Time: _____

## 2022-02-08 NOTE — PROGRESS NOTES
Salvador is a 30 y/o male who was admitted from [facility] ED to Station 20 d/t symptoms of acute psychosis. This appears to be in the context of history of Schizoaffective Disorder (Bipolar Type) and medication non-adherence. Pt has had more than 7 previous psychiatric inpatient hospitalizations according to collateral data gathered from pt's mother while pt was in ED. 5 of these have been in the past 2 years. His most recent stay was at [facility] in June 2021. He was brought into the ED by EMS after he had been pacing and rambling outside of a gas station for multiple hours.     Upon arrival to the unit, pt is calm, cooperative and compliant with search procedure. He is delayed in some of his responses; he seems to struggle with communicating his basic needs and answering basic questions. Pt appears disorganized with possible thought-blocking. Distractable with poor concentration. He was able to provide some answers to nursing admission interview questions but they were mostly one word answers. Eventually, pt stated he wished to go to sleep. Appeared tired and subsequently, seemed to sleep well the rest of this overnight shift.    Covid negative. UTOX negative. VSS. No medical concerns. Pt is on a 72HH. He is on the following precautions: elopement, self-injury, suicide.

## 2022-02-08 NOTE — TELEPHONE ENCOUNTER
R: 20 / OMelany   11:43PM - On call resident accepts for Jayson/OMelany - Chat[facility] team. Placed pt in queue. 12:14AM - Notified unit. Report at 1AM. 12:18AM - Notified ED.

## 2022-02-08 NOTE — PLAN OF CARE
BEHAVIORAL TEAM DISCUSSION    Participants: Dr. Sanchez, Psychiatry Resident, Med Student, Geeta PERAZA, Vannessa Mustafa CTC  Progress: Initial assessment   Anticipated length of stay: 5-7 days  Continued Stay Criteria/Rationale: Admitted for altered mental status. Needs clinical stabilization and medication managemnet.   Medical/Physical: See H&P note  Precautions:   Behavioral Orders   Procedures    Code 1 - Restrict to Unit    Elopement precautions    Routine Programming     As clinically indicated    Self Injury Precaution    Status 15     Every 15 minutes.    Suicide precautions     Patients on Suicide Precautions should have a Combination Diet ordered that includes a Diet selection(s) AND a Behavioral Tray selection for Safe Tray - with utensils, or Safe Tray - NO utensils       Plan: Patient will have ongoing psychiatric assessment. Medications will be reviewed and adjusted per MD as indicated. Outpatient providers will be contacted for care coordination. CTC will continue to assess needs and  ensure appropriate follow up care is in place.   Rationale for change in precautions or plan: None

## 2022-02-08 NOTE — PLAN OF CARE
"Initial Psychosocial Assessment    I have reviewed the chart, met with the patient, and developed Care Plan.  Information for assessment was obtained from:     Patient and chart review. Patient was cooperative in the beginning of the assessment, and then uncooperative.    Presenting Problem:  Salvador is a 31 year old who uses he/him pronouns. Patient presented to the ED via EMS and was referred to the ED by community provider(s) on 2/5/2022. He has a history of schizoaffective disorder, bipolar type. He presented to the ED for the following concerns: altered mental state.     \"Per EMT report pt was at [employer] pacing and speaking to self about seeing the devil. Pt thinks he is a baby and also GOD. Pt is unable to track conversations. Harlan Peña, RN\"     In the ED patient was not oriented to self, place, or date. The assessment stated his thought process slow and was unable to answer any questions. He demonstrates thought blocking, responded to internal stimuli and delusional thinking. He admits to having a mental health history and when asked what medications he takes he responded \"cigarettes.\" Patient has made statements about being God to others, he is restless, and slightly agitated. He is out of touch with reality. Disorganized. He is not able to participate in any mental health assessment. ([facility] ED assessment).    When writer met with patient for the assessment, patient is oriented to self, place, and date. Patient stated when he does not take medications he becomes delusional. He said he was not taking his medications prior to admission, and he was agreeable to be in the hospital for stabilization.    History of Mental Health and Chemical Dependency:    \"Per chart patient has a history of schizoaffective disorder, bipolar type. [facility]. His mother states his medications were changed in December and patient was not able to manage the transition to the new " "medications.  He had a therapist he saw once in 12/21. He was placed on a stay of commitment 6/21 at [facility] which has closed and per his mother they are no longer involved. His last admission to inpatient mental health appears to be 6/21 at [facility]. Mother reports he has had 7 + mental health admissions in various states, 5 in the last 2 years.\" ([facility] ED assessment)    \"Patient's mother Asha Maya [phone] was contacted for collateral information. She last spoke to patient yesterday but at that time he did not want to talk to her and hung up. She states she saw he was declining with his mental health and suspects that he has been non compliant with his medications, as this is his pattern. When he is on his medications he does well. She states he does not like to take his medications, thought he reported the last medication change in December he commented that the new medications were better for him, more himself. He has not been attending appointments Since his commitment case management ended she has been trying to help him but become exhausted and he gets defensive saying he can manage everything. Mother reports patient lives alone. He has a hard time functioning, especially in regards to financial management. He gets social security disability and spends it mostly in the first week of the month, seeking food from her, and racking up debt. He told her he started a job at [employer] last week. Mother would like to see patient court ordered to a living environment where he has better management of his medications and mental health.\"   ([facility] ED assessment)    Patient has a history of alcohol and marijuana use.    Family Description (Constellation, Family Psychiatric History):    Patient states he has 3 sisters, 1 brother, mother, and no relationship with his father. He is not close with his siblings and said he, \"Needs to get away from my mom.\"    Family history of MH/ELICIA:  Per [facility]   Sister with " bipolar  Maternal great-grandmother: schizophrenia  Mother: undiagnosed  Biological family members with suicide: none  Biological family members with substance abuse: none        Significant Life Events (Illness, Abuse, Trauma, Death):  Patient was in the  and was deployed.    Living Situation:  Patient lives alone.    Educational Background:  Patient attended college for 7-8 years and has one class to complete degree.    Occupational History:  Patient started a job at [employer] approximately a week ago. Patient is on social security disability.    Financial Status:  Patient is on social security disability.    Legal Issues:  None    Ethnic/Cultural Issues:  None    Spiritual Orientation:  None noted.     Service History:  Patient was in the  and was deployed to [place]. He was honorably discharged in 2013 due to mental illness. He is 100% disabled .    Social Functioning (organization, interests):  Patient enjoys exercising and has good social skills.    [facility]  No other providers    Social Service Assessment/Plan:  Patient will be monitored, observed, and stabilized. Physician to further assess for possible petition of commitment.

## 2022-02-09 PROCEDURE — 250N000013 HC RX MED GY IP 250 OP 250 PS 637: Performed by: EMERGENCY MEDICINE

## 2022-02-09 PROCEDURE — 250N000013 HC RX MED GY IP 250 OP 250 PS 637: Performed by: STUDENT IN AN ORGANIZED HEALTH CARE EDUCATION/TRAINING PROGRAM

## 2022-02-09 PROCEDURE — 250N000013 HC RX MED GY IP 250 OP 250 PS 637

## 2022-02-09 PROCEDURE — 124N000002 HC R&B MH UMMC

## 2022-02-09 PROCEDURE — 250N000013 HC RX MED GY IP 250 OP 250 PS 637: Performed by: PSYCHIATRY & NEUROLOGY

## 2022-02-09 PROCEDURE — 99233 SBSQ HOSP IP/OBS HIGH 50: CPT | Mod: GC | Performed by: PSYCHIATRY & NEUROLOGY

## 2022-02-09 RX ORDER — OLANZAPINE 5 MG/1
5 TABLET ORAL ONCE
Status: COMPLETED | OUTPATIENT
Start: 2022-02-09 | End: 2022-02-09

## 2022-02-09 RX ORDER — NICOTINE 21 MG/24HR
1 PATCH, TRANSDERMAL 24 HOURS TRANSDERMAL DAILY
Status: DISCONTINUED | OUTPATIENT
Start: 2022-02-09 | End: 2022-02-14 | Stop reason: HOSPADM

## 2022-02-09 RX ORDER — OLANZAPINE 15 MG/1
15 TABLET ORAL DAILY
Status: DISCONTINUED | OUTPATIENT
Start: 2022-02-10 | End: 2022-02-09

## 2022-02-09 RX ORDER — OLANZAPINE 15 MG/1
15 TABLET ORAL AT BEDTIME
Status: DISCONTINUED | OUTPATIENT
Start: 2022-02-10 | End: 2022-02-14 | Stop reason: HOSPADM

## 2022-02-09 RX ORDER — ARIPIPRAZOLE 5 MG/1
5 TABLET ORAL DAILY
Status: DISCONTINUED | OUTPATIENT
Start: 2022-02-09 | End: 2022-02-11

## 2022-02-09 RX ADMIN — OLANZAPINE 5 MG: 5 TABLET, FILM COATED ORAL at 20:59

## 2022-02-09 RX ADMIN — Medication 25 MCG: at 09:14

## 2022-02-09 RX ADMIN — NICOTINE POLACRILEX 2 MG: 2 GUM, CHEWING ORAL at 12:34

## 2022-02-09 RX ADMIN — NICOTINE 1 PATCH: 14 PATCH, EXTENDED RELEASE TRANSDERMAL at 14:47

## 2022-02-09 RX ADMIN — NICOTINE POLACRILEX 2 MG: 2 GUM, CHEWING ORAL at 07:19

## 2022-02-09 RX ADMIN — OLANZAPINE 10 MG: 10 TABLET, FILM COATED ORAL at 09:14

## 2022-02-09 RX ADMIN — NICOTINE POLACRILEX 2 MG: 2 GUM, CHEWING ORAL at 14:53

## 2022-02-09 RX ADMIN — NICOTINE POLACRILEX 2 MG: 2 GUM, CHEWING ORAL at 13:53

## 2022-02-09 RX ADMIN — ARIPIPRAZOLE 5 MG: 5 TABLET ORAL at 16:12

## 2022-02-09 ASSESSMENT — ACTIVITIES OF DAILY LIVING (ADL)
LAUNDRY: WITH SUPERVISION
DRESS: SCRUBS (BEHAVIORAL HEALTH)
HYGIENE/GROOMING: INDEPENDENT;PROMPTS
ORAL_HYGIENE: INDEPENDENT

## 2022-02-09 NOTE — PLAN OF CARE
"  Problem: Behavioral Health Plan of Care  Goal: Plan of Care Review  Outcome: No Change  Flowsheets (Taken 2/9/2022 1039)  Plan of Care Reviewed With: patient  Progress: no change  Patient Agreement with Plan of Care: (Requesting max dose Zyprexa 10 mg) agrees with comment (describe)     Problem: Cognitive Impairment (Psychotic Signs/Symptoms)  Goal: Optimal Cognitive Function (Psychotic Signs/Symptoms)  Outcome: No Change     Problem: Decreased Participation and Engagement (Psychotic Signs/Symptoms)  Goal: Increased Participation and Engagement (Psychotic Signs/Symptoms)  Outcome: No Change     Salvador walking about unit-appears preoccupied-at times talking to self and gesturing as though in conversation with other when no one present-accepted AM medications, but emphasized he wanted no more than 10 mg of Zyprexa, \"It just takes time to get in my system, more than that is too much!\"-Salvador completed menu with small amt assistance-pleasant in interactions   "

## 2022-02-09 NOTE — PROGRESS NOTES
02/09/22 1500   General Information   Has Not Attended OT as of: 02/09/22     Pt approached and requested to join group on 2 out of 3 groups, however when requested to grab a mask before joining, pt walked away and did not return. Plan to meet with pt as is appropriate, to identify the role of occupational therapy within the mental health setting and encourage participation in OT groups. As attendance is established, will plan to provide pt with self-assessment to inform individualized treatment and goal planning.

## 2022-02-09 NOTE — PLAN OF CARE
Assessment/Intervention/Current Symptoms and Care Coordination    Attended team meeting and reviewed chart notes.    Writer met with pt in his room to introduce self and share role on the team. We talked about who pt had for a psychiatrist. Pt then got up abruptly and left the room.    Writer found pt's car at [employer] [address] in [place]. The person I spoke with was the  that picked him up on the freeway and brought him to [place] [facility] [place] in [place]. The police were called and pt was brought to the hospital. Pt's car was ordered to be towed by [facility] [employer]. Pt does not want to get help from his mother as she would 'hold it' against him. He will call an Uber to drive to [place] tomorrow. He feels much better and know he needs to take his medications.     Dr. Sanchez wants pt to remain in the hospital for a few days to stabilize. Writer left message with pt's mother regarding where the car is located and hoping she can help retrieve it.       Discharge Plan or Goal  Return to home      Barriers to Discharge   Needs stabilization      Referral Status  None made  Will present the idea of day treatment for support upon discharge      Legal Status  voluntary

## 2022-02-09 NOTE — PROGRESS NOTES
Patient transferred to station 22 from station 20. Transfer order in place. Patient arrived to the unit. Calm and cooperative. Given brief tour and snack as requested. Provided a change of scrubs and showed his room. Denies SI/SIB. No scheduled or PRN medications. Continue to monitor and assess.

## 2022-02-09 NOTE — PROGRESS NOTES
Pt ambulated over to st [facility] for transfer at 2145. Report given to Bre PERAZA. Pt has been isolative in room leading up to transfer with no behavioral disturbances noted. He was able to answer simple questions and presented well superficially, but affect blunted. He admits to feeling disorganized and apparently takes PRN Rx when prompted, as he is too disorganized to make it to nursing Rx window per day shift report. PTA pt apparently stopped neuroleptic on his own and decompensated, but is now compliant with restarting Rx.
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
## 2022-02-10 PROCEDURE — 250N000013 HC RX MED GY IP 250 OP 250 PS 637: Performed by: EMERGENCY MEDICINE

## 2022-02-10 PROCEDURE — 124N000002 HC R&B MH UMMC

## 2022-02-10 PROCEDURE — 250N000013 HC RX MED GY IP 250 OP 250 PS 637: Performed by: PSYCHIATRY & NEUROLOGY

## 2022-02-10 PROCEDURE — 90853 GROUP PSYCHOTHERAPY: CPT

## 2022-02-10 PROCEDURE — 250N000013 HC RX MED GY IP 250 OP 250 PS 637

## 2022-02-10 PROCEDURE — 250N000013 HC RX MED GY IP 250 OP 250 PS 637: Performed by: STUDENT IN AN ORGANIZED HEALTH CARE EDUCATION/TRAINING PROGRAM

## 2022-02-10 PROCEDURE — 99233 SBSQ HOSP IP/OBS HIGH 50: CPT | Mod: GC | Performed by: PSYCHIATRY & NEUROLOGY

## 2022-02-10 RX ORDER — PROPRANOLOL HYDROCHLORIDE 10 MG/1
10 TABLET ORAL 3 TIMES DAILY PRN
Status: DISCONTINUED | OUTPATIENT
Start: 2022-02-10 | End: 2022-02-14 | Stop reason: HOSPADM

## 2022-02-10 RX ADMIN — NICOTINE POLACRILEX 2 MG: 2 GUM, CHEWING ORAL at 10:49

## 2022-02-10 RX ADMIN — NICOTINE POLACRILEX 2 MG: 2 GUM, CHEWING ORAL at 07:10

## 2022-02-10 RX ADMIN — ARIPIPRAZOLE 5 MG: 5 TABLET ORAL at 08:19

## 2022-02-10 RX ADMIN — OLANZAPINE 15 MG: 15 TABLET, FILM COATED ORAL at 20:29

## 2022-02-10 RX ADMIN — NICOTINE POLACRILEX 2 MG: 2 GUM, CHEWING ORAL at 18:53

## 2022-02-10 RX ADMIN — NICOTINE POLACRILEX 2 MG: 2 GUM, CHEWING ORAL at 08:33

## 2022-02-10 RX ADMIN — NICOTINE POLACRILEX 2 MG: 2 GUM, CHEWING ORAL at 15:26

## 2022-02-10 RX ADMIN — NICOTINE 1 PATCH: 14 PATCH, EXTENDED RELEASE TRANSDERMAL at 08:20

## 2022-02-10 RX ADMIN — NICOTINE POLACRILEX 2 MG: 2 GUM, CHEWING ORAL at 13:33

## 2022-02-10 RX ADMIN — NICOTINE POLACRILEX 2 MG: 2 GUM, CHEWING ORAL at 16:42

## 2022-02-10 RX ADMIN — PROPRANOLOL HYDROCHLORIDE 10 MG: 10 TABLET ORAL at 13:24

## 2022-02-10 RX ADMIN — NICOTINE POLACRILEX 2 MG: 2 GUM, CHEWING ORAL at 12:18

## 2022-02-10 RX ADMIN — NICOTINE POLACRILEX 2 MG: 2 GUM, CHEWING ORAL at 20:31

## 2022-02-10 RX ADMIN — Medication 25 MCG: at 08:19

## 2022-02-10 ASSESSMENT — ACTIVITIES OF DAILY LIVING (ADL)
LAUNDRY: WITH SUPERVISION
HYGIENE/GROOMING: INDEPENDENT
ORAL_HYGIENE: INDEPENDENT
DRESS: INDEPENDENT

## 2022-02-10 NOTE — PLAN OF CARE
Plan of care   Problem: Sleep Disturbance  Goal: Adequate Sleep/Rest  Outcome: Improving  Received pt sleeping in bed with quite and unlabored respiration; No PRN was requested or administered; No safety concern to report; appeared to have slept for 7 hrs; will continue to monitor and assess.

## 2022-02-10 NOTE — PLAN OF CARE
"Nursing plan of care   Problem: Sensory Perception Impairment (Psychotic Signs/Symptoms)  Goal: Decreased Sensory Symptoms (Psychotic Signs/Symptoms)  Outcome: No Change  Flowsheets (Taken 2/9/2022 2125)  Mutually Determined Action Steps (Decreased Sensory Symptoms): other (see comments)   Pt was visible in the milieu; appeared distracted and poor concentration; when asked if he feels depressed, pt replied \"Always\"; endorsed hearing voices; denied anxiety and SI/SIB; ate dinner and snack; received scheduled one time 5 mg Zyprexa without issue; denied medication side effect; No PRN requested or administered; will continue to monitor and assess.     "

## 2022-02-10 NOTE — PLAN OF CARE
Assessment/Intervention/Current Symptoms and Care Coordination    Attended team meeting and reviewed chart notes.    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    Pt is taking meds as prescribed and attending groups.        Discharge Plan or Goal  Discharge to home        Barriers to Discharge   Needs stabilization  Pt's meds are being adjusted      Referral Status  None made      Legal Status  voluntary

## 2022-02-10 NOTE — PROGRESS NOTES
"  ----------------------------------------------------------------------------------------------------------  [facility], [place]   Psychiatric Progress Note  Hospital Day #2     Interim History:   The patient's care was discussed with the treatment team and chart notes were reviewed.    Sleep: 7 hours (02/10/22 0600)  Scheduled Medications: took all scheduled medications as prescribed   PRN medications: no psychiatric PRNs given     Staff Report:   Per nursing:   Pt was visible in the milieu; appeared distracted and poor concentration; when asked if he feels depressed, pt replied \"Always\"; endorsed hearing voices; denied anxiety and SI/SIB; ate dinner and snack; received scheduled one time 5 mg Zyprexa without issue; denied medication side effect; No PRN requested or administered; will continue to monitor and assess.   Nursing claims that he seems better today than he did yesterday. He needed help to fill out his food menu.     Patient Interview:   Today on rounds,  Salvador was asking if he can have the clothes he came in with. He was told this was okay. On interviewing him he says he feels restless and that his mind is \"clearer.\" He says he slept well last night but woke up periodically. On asking if he hears voices he says \"I don't hear a person talking to me\" I \"hear my thoughts.\" When asked if the voice is his he says \"It's not me, its the illness.\" He says the thoughts are \"apathetic\" in nature. He does not have SI or thoughts of hurting himself or others. He says his bowel movements have been normal and that he plans to shower later today. He was curios as to how much longer he would have to stay here and what the plans for future medication dosing are. He did not voice plans to leave today.      Later, patient complained of restlessness and the need to pace.  He agreed with a trial of propranolol prn for this.     The risks, benefits, alternatives and side effects of any " "medication changes have been discussed and are understood by the patient and other caregivers.          Psychiatric Examination:   BP (!) 143/85 (BP Location: Right arm)   Pulse 92   Temp 98.7  F (37.1  C) (Tympanic)   Resp 14   Wt 95.7 kg (211 lb)   SpO2 96%   Weight is 211 lbs 0 oz  There is no height or weight on file to calculate BMI.    MENTAL STATUS EXAM  Appearance:  normal posture and well-developed, well-nourished  Attitude:  cooperative, engaged and friendly  Psychomotor:  no evidence of tics, dystonia, or tardive dyskinesia and pacing (Salvador has been walking up and down the halls all morning)  Eye Contact: appropriate  Speech:  fluent English, normal tone, increased rate   Mood: \"I am feeling better today\"  Affect: decreased energy level from yesterday, appropriate affect   Thought Content: ruminations and what Salvador describes as a cirular thought process of apathy      Thought Process: ruminative, looseness of association and tangential  Sensorium: awake and alert  Cognition: memory grossly intact   Impulse control: good  Insight: recognizes that he is in a \"bad mental state\"   Judgment: good         Labs:   No results found for this or any previous visit (from the past 24 hour(s)).     Assessment    Principal Diagnosis:   # Bipolar type 1 vs schizoaffective disorder    Diagnostic Impression:   Salvador Macario is a 31 year old male with a past psychiatric history of bipolar type 1 vs schizoaffective disorder who presented to the ED with aaron and psychosis in the context of medication non - adherance. Significant symptoms include mood lability and psychosis. His last psychiatric hospitalization was on 6/21/22.  He is currently followed at the [facility].  Substance use does not appear to be playing a contributing role in the patient's presentation. The MSE on admission is notable for psychosis and aaron.  His definitive diagnosis is still in evolution; differential includes bipolar " type 1 vs schizoaffective disorder bipolar type.       Prediposing factors for the patient's presentation include family history of bipolar in sister and patient reported history of trauma. Precipitating factors for the patient's presentation are medication non-adherence and patient reported lack of support group. Patient has protective factors in the form of family support, engaged in care, and self awareness about mental illness.     Psychiatric Hospital course:   Salvador Macario was admitted to [facility] as a voluntary patient/ on a 72 hour hold. PTA  and lamictal were held due to patient non adherence and active psychosis.       Medical course   Admission labs notable for:  - CMP normal  - CBC normal  - TSH 0.38 (low normal) with normal T4  - Vitamin D normal, Vitamin B12 normal  - UDS Negative for all tested substances  - COVID negative    Plan     Today's Changes:  - Start propranolol 10mg tid/prn akathisia    Psychotropic Medications:  Scheduled Psych Medications:  - Olanzapine 15 mg PO   - Aripiprazole 5 mg PO   - Vitamin D3, 25 mcg PO    PRN Psych Medications  - Hydroxyzine 25 mg PRN Q4H  - Olanzapine 10 mg PO/IM prn Q2H severe agitation/psychosis  - Trazodone 50 mg PRN QHS    Patient will be treated in therapeutic milieu with appropriate individual and group therapies as described.    Legal Status:   Orders Placed This Encounter      Legal status Voluntary      Safety Assessment:   Behavioral Orders   Procedures    Code 1 - Restrict to Unit    Elopement precautions    Routine Programming     As clinically indicated    Self Injury Precaution    Status 15     Every 15 minutes.    Suicide precautions     Patients on Suicide Precautions should have a Combination Diet ordered that includes a Diet selection(s) AND a Behavioral Tray selection for Safe Tray - with utensils, or Safe Tray - NO utensils         Disposition: Pending stabilization & development of a safe discharge plan.     The patient was seen  and the plan was discussed with the attending physician.     This patient was seen and discussed with my attending physician, Dr. Micheal Sanchez.  Mehdi Villegas. MS3  02/10/22       I, Cornelio Franklin, was present with the medical/BETTINA student who participated in the service and in the documentation of the note.  I have verified the history and personally performed the MSE and medical decision making.  I agree with the assessment and plan of care as documented in the note.       Cornelio Franklin MD  PGY1 Psychiatry Resident   Date of Service (when I saw the patient): 02/09/22     Resident Attestation:   I, Cornelio Franklin, was present with the medical/BETTINA student who participated in the service and in the documentation of the note.  I have verified the history and personally performed the MSE and medical decision making.  I agree with the assessment and plan of care as documented in the note.       Cornelio Franklin MD  PGY1 Psychiatry Resident   Date of Service (when I saw the patient): 02/10/22     Psychiatry Attending Attestation:   This patient has been seen and evaluated by me, Micheal Sanchez.  I have discussed this patient with the psychiatry resident and I agree with the findings and plan in this note.    I have reviewed today's vital signs, medications, labs and imaging.     Micheal Aguilar MD on 2/10/2022 at 1:12 PM

## 2022-02-10 NOTE — PROGRESS NOTES
"   02/10/22 1014   Groups   Details Psychotherapy Group     Number of patients attending the group:  3  Group Length:  0.45    Group Therapy     Summary of Group / Topics Discussed:  Group topic today was movement and relaxation. A DVD was utilized to guide the patients in movement and relaxation techniques. After the DVD WR attempted to facilitate discussion around relaxation.     The  Psychotherapy group goal is to promote insight to positive choice and change. Group processing is within a supportive and safe environment. Patients will process emotions using verbal group and expressive psychotherapy interventions.        Assessment /Patient Response- Pt attended the last 20 minutes of group. He appeared to be distracted by internal stimuli. After sitting and not participating for 5 minutes pt asked \"what is this?\" WR responded with the group topic and explanation of the exercises. During the rest of the DVD the pt did not look at the TV at all. He was looking at the window and fidgeting quite a bit with different parts of his body. WR noted that pt giggled to himself a couple of times, and at one point looked at a poster on the wall and asked \"what city is that?\" At the end of group when WR prompted the group for their preferred relaxation activities he blurted out \"exercise\" and did not answer to any of WR's follow up questions.                     "
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
## 2022-02-10 NOTE — PLAN OF CARE
[facility] Occupational Therapy Daily Note    Date of Service: February 10, 2022    Problem: Relapse Prevention  Goal: Engage in OT Group  Description: Engage in 1 or more OT group activities that support recovery each day; Assessment to follow if participation increases.    Description: Patient attended 2 occupational therapy groups for less than 5 minutes each today. Overall, presented with a congruent and content mood/affect.  Writer observed pt outside of group time smiling to himself in the hallways and spending extra time peering into a garbage.     11:15 - 12:00. Pt joined group room to learn about topic but once he was told that group would be 45 minutes, he opted to leave.  No charge.     1:15 - 2:00. Pt joined for the last 5 minutes of open clinic time.  He opted to draw with pen and paper. Pt engaged in casual conversation with staff about his favorite pizza.  Appeared content and reality oriented in context of group activity and conversation.     Continue to encourage group attendance. Provide graded occupation-based activities to promote progress towards OT goals and to support ongoing assessment.  Provide self-assessment as participation increases.     Jennifer Martinez OT on 2/10/2022

## 2022-02-11 PROCEDURE — 250N000013 HC RX MED GY IP 250 OP 250 PS 637: Performed by: EMERGENCY MEDICINE

## 2022-02-11 PROCEDURE — 250N000013 HC RX MED GY IP 250 OP 250 PS 637: Performed by: PSYCHIATRY & NEUROLOGY

## 2022-02-11 PROCEDURE — 124N000002 HC R&B MH UMMC

## 2022-02-11 PROCEDURE — 99233 SBSQ HOSP IP/OBS HIGH 50: CPT | Mod: GC | Performed by: PSYCHIATRY & NEUROLOGY

## 2022-02-11 PROCEDURE — 250N000013 HC RX MED GY IP 250 OP 250 PS 637: Performed by: STUDENT IN AN ORGANIZED HEALTH CARE EDUCATION/TRAINING PROGRAM

## 2022-02-11 PROCEDURE — 250N000013 HC RX MED GY IP 250 OP 250 PS 637

## 2022-02-11 PROCEDURE — H2032 ACTIVITY THERAPY, PER 15 MIN: HCPCS

## 2022-02-11 RX ORDER — ARIPIPRAZOLE 10 MG/1
10 TABLET ORAL DAILY
Status: DISCONTINUED | OUTPATIENT
Start: 2022-02-12 | End: 2022-02-14 | Stop reason: HOSPADM

## 2022-02-11 RX ORDER — ARIPIPRAZOLE 5 MG/1
5 TABLET ORAL ONCE
Status: COMPLETED | OUTPATIENT
Start: 2022-02-11 | End: 2022-02-11

## 2022-02-11 RX ADMIN — NICOTINE POLACRILEX 2 MG: 2 GUM, CHEWING ORAL at 10:54

## 2022-02-11 RX ADMIN — Medication 25 MCG: at 08:22

## 2022-02-11 RX ADMIN — NICOTINE 1 PATCH: 14 PATCH, EXTENDED RELEASE TRANSDERMAL at 11:49

## 2022-02-11 RX ADMIN — NICOTINE POLACRILEX 2 MG: 2 GUM, CHEWING ORAL at 09:19

## 2022-02-11 RX ADMIN — NICOTINE POLACRILEX 2 MG: 2 GUM, CHEWING ORAL at 19:48

## 2022-02-11 RX ADMIN — NICOTINE POLACRILEX 2 MG: 2 GUM, CHEWING ORAL at 15:18

## 2022-02-11 RX ADMIN — NICOTINE POLACRILEX 2 MG: 2 GUM, CHEWING ORAL at 08:29

## 2022-02-11 RX ADMIN — NICOTINE POLACRILEX 2 MG: 2 GUM, CHEWING ORAL at 14:02

## 2022-02-11 RX ADMIN — NICOTINE POLACRILEX 2 MG: 2 GUM, CHEWING ORAL at 11:50

## 2022-02-11 RX ADMIN — NICOTINE POLACRILEX 2 MG: 2 GUM, CHEWING ORAL at 18:25

## 2022-02-11 RX ADMIN — NICOTINE POLACRILEX 2 MG: 2 GUM, CHEWING ORAL at 16:21

## 2022-02-11 RX ADMIN — ARIPIPRAZOLE 5 MG: 5 TABLET ORAL at 08:21

## 2022-02-11 RX ADMIN — ARIPIPRAZOLE 5 MG: 5 TABLET ORAL at 11:15

## 2022-02-11 RX ADMIN — OLANZAPINE 15 MG: 15 TABLET, FILM COATED ORAL at 20:13

## 2022-02-11 ASSESSMENT — ACTIVITIES OF DAILY LIVING (ADL)
HYGIENE/GROOMING: INDEPENDENT
LAUNDRY: UNABLE TO COMPLETE
ORAL_HYGIENE: INDEPENDENT
HYGIENE/GROOMING: INDEPENDENT
ORAL_HYGIENE: INDEPENDENT
DRESS: STREET CLOTHES
DRESS: STREET CLOTHES;INDEPENDENT

## 2022-02-11 NOTE — PLAN OF CARE
"  Problem: Behavior Regulation Impairment (Psychotic Signs/Symptoms)  Goal: Improved Behavioral Control (Psychotic Signs/Symptoms)  Outcome: Improving   \"I feel a lot better. I'm better emotionally. I have better mental resistance. I'm not as lost in thought, I'm clearer.\" Depression has improved \"a little.\" Always has anxiety but has improved. Denies racing thoughts which is an improvement. Went to one group yesterday. Is minimally social. Has often been pacing in the [place] this morning.  "

## 2022-02-11 NOTE — DISCHARGE SUMMARY
"    Psychiatric Discharge Summary    Hospital Day #3    Salvador Macario MRN# [id number]   Age: 31 year old YOB: 1990     Date of Admission:  2/5/2022  Date of Discharge:  2/14/2022  Admitting Physician:  Micheal Aguilar MD  Discharge Physician:  Micheal Aguilar MD         Event Leading to Hospitalization:   \"Salvador Macario is a 31 year old male who was brought to the emergency department by EMS from [facility].  EMS got the call that there was an emotionally disturbed person who had been dropped off at a gas station and had been walking around talking to himself for the past hour or 2 and would not leave when staff asked him to leave.  According to EMS, he had stated that he is off of his medications and appeared to be responding to internal stimuli.  He was telling them that ducks and bunnies talk to him and that elves are real.  He began to get agitated when talking about the goddess of Mercy but was redirectable and they did not need to sedate her restrain him.  I am not able to get much history from the patient.  Patient seems very disorganized.  He tells me that he is a baby and that he is the superhero that we will save them all.  He also states that everything is a dream, the hospital is a dream and maybe the hospital is the place for him.  Patient did state to the nurse \"I am schizophrenic and I am off my meds\".       Michael Loo MD on 02/05 for additional details.          Diagnoses:   #Primary Psychiatric Diagnosis  # Bipolar Disorder type 1 vs schizoaffective disorder    Diagnostic Impression:   Salvador Macario is a 31 year old male with a past psychiatric history of bipolar type 1 vs schizoaffective disorder who presented to the ED with aaron and psychosis in the context of medication non - adherance. Significant symptoms include mood lability and psychosis. His last psychiatric hospitalization was on 6/21/22.  He is currently " followed at the [facility].  Substance use does not appear to be playing a contributing role in the patient's presentation. The MSE on admission was notable for psychosis and aaron.  His definitive diagnosis is still in evolution; differential includes bipolar type 1 vs schizoaffective disorder bipolar type.       Prediposing factors for the patient's presentation include family history of bipolar in sister and patient reported history of trauma. Precipitating factors for the patient's presentation are medication non-adherence and patient reported lack of support group. Patient has protective factors in the form of family support, engaged in care, and self awareness about mental illness.          Consults:   None         Hospital Course:   Psychiatric Course:  Salvador Macario was admitted to [facility] with attending Micheal Aguilar MD  as a voluntary patient. PTA medication were reviewed and continued, including Olanzapine 10 mg at bedtime. On 2/9 Abilify was started and Olanzapine was increased to 15 mg at bedtime. On 2/11, Abilify was increased to 10 mg at bedtime. On 2/14 when reassessed by primary team, Salvador was tolerating well his medications with no SE reported and symptoms had improved. Pt was future-oriented and looking forward to going back to work.       Medical Course:  NA    Risk Assessment:      Today Salvador Macario denies SI, SIB and HI. No overt evidence of psychosis or aaron observed. Patient grossly appears to be cognitively intact. Insight and judgement have significantly improved since admission. Patient reports he is aware of needing f/u with his outpatient team for medication management, psychotherapy and the importance of having a good support system .  Patient is aware of consequences of his mental illness and not following through with plan. Patient has not exhibited aggressive or violence behaviors since prior to this admission. Throughout patient's stay they  were cooperative, friendly, redirectable and showed engagement and willingness to working with primary team and staff.  He has notable risk factors for self-harm, including single status and psychosis. However, risk is mitigated by commitment to family, ability to volunteer a safety plan and history of seeking help when needed. Patient does/does not have immediate access to firearms. Therefore, based on all available evidence including the factors cited above, he does not appear to be at imminent risk for self-harm, does not meet criteria for a 72-hr hold, and therefore remains appropriate for ongoing outpatient level of care. Patient agreed to further reduce risk of self-harm by making sure he'll follow up appointments and agreed to remain medication adherent. Additional steps taken to minimize risk include: medication optimization, close psychiatric follow up and provision of crisis resources. Patient expressed understanding of risk associated with his mental illness including increased risk of harm to self or others.     Salvador was released to home. During this admission, he did participate in groups and was visible in the milieu, and his symptoms of mood lability, disorganized speech/behavior/manic symptoms improved. At the time of discharge he was determined to not be a danger to himself or others.     This document serves as a transfer of care to Salvador Macario's outpatient providers.         Discharge Medications:     Current Discharge Medication List      CONTINUE these medications which have NOT CHANGED    Details   nicotine (NICORETTE) 2 MG gum Take 2 mg by mouth daily as needed      Omega-3 Fatty Acids (FISH OIL PO) Take 1 capsule by mouth daily      VITAMIN D PO Take 1 tablet by mouth daily                  Psychiatric Examination:   Appearance:  no apparent distress, normal posture, normal gait, well-developed, well-nourished, appears stated age, good hygiene and appropriately  "dressed  Attitude:  cooperative, engaged, friendly and pleasant  Psychomotor:  no evidence of tics, dystonia, or tardive dyskinesia  Eye Contact: appropriate  Speech:  fluent English, normal tone, normal prosody and talkative  Mood: \"Doing well\"  Affect:  Congruent with mood and appropriate  Thought Content: denies suicidal ideation, denies homicidal ideation and no delusions were elicited. Future-oriented and thinking he is ready to go back to work.    Thought Process: linear, coherent and goal directed  Sensorium: awake and alert  Cognition: memory grossly intact  Impulse control: good  Insight: good  Judgment: good, improved         Discharge Plan:   Health Care Follow-up: Medication management [phone] Fax:[phone]  [facility]. Wilma Burt  @ 9:30- They will email you with a link   Therapist-//[facility]  Erica Stevens PsyD, [place] [id number]  [phone] (Work)    [phone] (Fax)    @ 1:00- this will be a phone appointment, Please call to confirm you will be available.      Pt seen and discussed with my attending physician,  Dr. Micheal Sanchez MD.   Cornelio Franklin MD  PGY-1 Psychiatry Resident  [facility]    Attestation:  The patient has been seen and evaluated by me, Micheal Sanchez . I have examined the patient today and reviewed the discharge plan with the resident. I agree with the final assessment and plan, as noted in the discharge summary. I have reviewed today's vital signs, medications, labs and imaging.    Total time discharge plannin minutes    Micheal Aguilar MD on 2022 at 10:29 PM          Appendix A: All Labs This Admission:     Results for orders placed or performed during the hospital encounter of 22   Asymptomatic COVID-19 Virus (Coronavirus) by PCR Nasopharyngeal     Status: Normal    Specimen: Nasopharyngeal; Swab "   Result Value Ref Range    SARS CoV2 PCR Negative Negative    Narrative    Testing was performed using the miri  SARS-CoV-2 & Influenza A/B Assay on the miri  [place]  System.  This test should be ordered for the detection of SARS-COV-2 in individuals who meet SARS-CoV-2 clinical and/or epidemiological criteria. Test performance is unknown in asymptomatic patients.  This test is for in vitro diagnostic use under the FDA EUA for laboratories certified under CLIA to perform moderate and/or high complexity testing. This test has not been FDA cleared or approved.  A negative test does not rule out the presence of PCR inhibitors in the specimen or target RNA in concentration below the limit of detection for the assay. The possibility of a false negative should be considered if the patient's recent exposure or clinical presentation suggests COVID-19.  [facility] are certified under the Clinical Laboratory Improvement Amendments of 1988 (CLIA-88) as qualified to perform moderate and/or high complexity laboratory testing.   Drug abuse screen 1 urine (ED)     Status: Normal   Result Value Ref Range    Amphetamines Urine Screen Negative Screen Negative    Barbiturates Urine Screen Negative Screen Negative    Benzodiazepines Urine Screen Negative Screen Negative    Cannabinoids Urine Screen Negative Screen Negative    Cocaine Urine Screen Negative Screen Negative    Opiates Urine Screen Negative Screen Negative   Comprehensive metabolic panel     Status: Normal   Result Value Ref Range    Sodium 140 133 - 144 mmol/L    Potassium 4.2 3.4 - 5.3 mmol/L    Chloride 108 94 - 109 mmol/L    Carbon Dioxide (CO2) 28 20 - 32 mmol/L    Anion Gap 4 3 - 14 mmol/L    Urea Nitrogen 19 7 - 30 mg/dL    Creatinine 0.79 0.66 - 1.25 mg/dL    Calcium 8.8 8.5 - 10.1 mg/dL    Glucose 98 70 - 99 mg/dL    Alkaline Phosphatase 44 40 - 150 U/L    AST 16 0 - 45 U/L    ALT 41 0 - 70 U/L    Protein Total 6.8 6.8 - 8.8 g/dL    Albumin  3.4 3.4 - 5.0 g/dL    Bilirubin Total 0.3 0.2 - 1.3 mg/dL    GFR Estimate >90 >60 mL/min/1.73m2   Lipid panel     Status: Abnormal   Result Value Ref Range    Cholesterol 161 <200 mg/dL    Triglycerides 68 <150 mg/dL    Direct Measure HDL 40 >=40 mg/dL    LDL Cholesterol Calculated 107 (H) <=100 mg/dL    Non HDL Cholesterol 121 <130 mg/dL    Narrative    Cholesterol  Desirable:  <200 mg/dL    Triglycerides  Normal:  Less than 150 mg/dL  Borderline High:  150-199 mg/dL  High:  200-499 mg/dL  Very High:  Greater than or equal to 500 mg/dL    Direct Measure HDL  Female:  Greater than or equal to 50 mg/dL   Male:  Greater than or equal to 40 mg/dL    LDL Cholesterol  Desirable:  <100mg/dL  Above Desirable:  100-129 mg/dL   Borderline High:  130-159 mg/dL   High:  160-189 mg/dL   Very High:  >= 190 mg/dL    Non HDL Cholesterol  Desirable:  130 mg/dL  Above Desirable:  130-159 mg/dL  Borderline High:  160-189 mg/dL  High:  190-219 mg/dL  Very High:  Greater than or equal to 220 mg/dL   TSH with free T4 reflex and/or T3 as indicated     Status: Abnormal   Result Value Ref Range    TSH 0.38 (L) 0.40 - 4.00 mU/L   Vitamin B12     Status: Normal   Result Value Ref Range    Vitamin B12 532 193 - 986 pg/mL   Folate     Status: Normal   Result Value Ref Range    Folic Acid 16.7 >=5.4 ng/mL   CBC with platelets and differential     Status: None   Result Value Ref Range    WBC Count 5.9 4.0 - 11.0 10e3/uL    RBC Count 4.50 4.40 - 5.90 10e6/uL    Hemoglobin 13.4 13.3 - 17.7 g/dL    Hematocrit 41.2 40.0 - 53.0 %    MCV 92 78 - 100 fL    MCH 29.8 26.5 - 33.0 pg    MCHC 32.5 31.5 - 36.5 g/dL    RDW 11.3 10.0 - 15.0 %    Platelet Count 319 150 - 450 10e3/uL    % Neutrophils 50 %    % Lymphocytes 32 %    % Monocytes 8 %    % Eosinophils 9 %    % Basophils 1 %    % Immature Granulocytes 0 %    NRBCs per 100 WBC 0 <1 /100    Absolute Neutrophils 3.0 1.6 - 8.3 10e3/uL    Absolute Lymphocytes 1.9 0.8 - 5.3 10e3/uL    Absolute Monocytes 0.5  0.0 - 1.3 10e3/uL    Absolute Eosinophils 0.5 0.0 - 0.7 10e3/uL    Absolute Basophils 0.1 0.0 - 0.2 10e3/uL    Absolute Immature Granulocytes 0.0 <=0.4 10e3/uL    Absolute NRBCs 0.0 10e3/uL   T4 free     Status: Normal   Result Value Ref Range    Free T4 0.79 0.76 - 1.46 ng/dL   Urine Drugs of Abuse Screen     Status: Normal    Narrative    The following orders were created for panel order Urine Drugs of Abuse Screen.  Procedure                               Abnormality         Status                     ---------                               -----------         ------                     Drug abuse screen 1 urin...[[id number]]  Normal              Final result                 Please view results for these tests on the individual orders.   CBC with platelets differential     Status: None    Narrative    The following orders were created for panel order CBC with platelets differential.  Procedure                               Abnormality         Status                     ---------                               -----------         ------                     CBC with platelets and d...[[id number]]                      Final result                 Please view results for these tests on the individual orders.

## 2022-02-11 NOTE — PLAN OF CARE
"Nursing plan of care   Problem: Behavioral Health Plan of Care  Goal: Develops/Participates in Therapeutic [place] to Support Successful Transition  Outcome: Improving     Problem: Decreased Participation and Engagement (Psychotic Signs/Symptoms)  Goal: Increased Participation and Engagement (Psychotic Signs/Symptoms)  Outcome: Improving  Flowsheets (Taken 2/10/2022 1855)  Mutually Determined Action Steps (Increased Participation and Engagement):    other (see comments)    identifies future-oriented goal  Pt stated, \" I am doing well, greatly improved, what did you think?\" pt also stated, \" I think that Abilify works\"; pt reported depression and anxiety is getting better; denied SI/SIB and AVH; denied issue with sleep and appetite; ate dinner and snack; calm and pleasant on approach; dresses and groomed; attended group; medication compliant; denied medication side effect; requested and received PRN nicotine gum (see eMAR), Around 2025, pt approached the med room window and requested for stronger medication other than hydroxyzine. Pt was appeared anxious but denied anxiety and akathisia. BP-146/81 and P- 92; administered scheduled 15 mg Zyprexa PO. Will continue to monitor and assess           "

## 2022-02-11 NOTE — PLAN OF CARE
Assessment/Intervention/Current Symptoms and Care Coordination    Attended team meeting and reviewed chart notes.    Pt was able to figure out how to get his car back. He paid the impound amount and his parents went and picked up his car.    Pt is improving however still responding to internal stimuli.        Discharge Plan or Goal    Return to home    Barriers to Discharge   Needs stabilization      Referral Status  None made        Legal Status  voluntary

## 2022-02-11 NOTE — PROGRESS NOTES
"  ----------------------------------------------------------------------------------------------------------  [facility], [place]   Psychiatric Progress Note  Hospital Day #3     Interim History:   The patient's care was discussed with the treatment team and chart notes were reviewed.    Sleep: 7 hours (02/11/22 0600)  Scheduled Medications: took all scheduled medications as prescribed   PRN medications: no psychiatric PRNs given     Staff Report:   Per nursing:    Pt stated, \" I am doing well, greatly improved, what did you think?\" pt also stated, \" I think that Abilify works\"; pt reported depression and anxiety is getting better; denied SI/SIB and AVH; denied issue with sleep and appetite; ate dinner and snack; calm and pleasant on approach; dresses and groomed; attended group; medication compliant; denied medication side effect; requested and received PRN nicotine gum (see eMAR), Around 2025, pt approached the med room window and requested for stronger medication other than hydroxyzine. Pt was appeared anxious but denied anxiety and akathisia. BP-146/81 and P- 92; administered scheduled 15 mg Zyprexa PO. Will continue to monitor and assess                   Patient Interview:   Today on rounds,  Salvador was interviewed in his room. His speech is more pressured today. Salvador really wants to go home \"its super bowl weekend and I want to watch it with my friends.\" Salvador says \"this place drives me crazy and is giving me a headache.\" He denies feeling restless and says \"I am able to reflect internally.\" Salvador says he feels agitated and is annoyed with \"hearing the same thing over and over.\" Salvador says when he came into the hospital his manic state was at a 95% and now it as a 65%.  Salvador's speech was more voluminous today than yesterday and he seemed more agitated as well.     The risks, benefits, alternatives and side effects of any medication changes have been discussed and are " "understood by the patient and other caregivers.          Psychiatric Examination:   BP (!) 146/81 (BP Location: Left arm, Patient Position: Sitting)   Pulse 92   Temp 98.5  F (36.9  C) (Tympanic)   Resp 16   Wt 97.1 kg (214 lb)   SpO2 98%   Weight is 214 lbs 0 oz  There is no height or weight on file to calculate BMI.    MENTAL STATUS EXAM  Appearance:  normal posture and well-developed, well-nourished, sitting on the bed, wearing personal clothes.   Attitude:  cooperative, engaged, somewhat anxious  Psychomotor:  no evidence of tics, dystonia, or tardive dyskinesia and pacing  Eye Contact: appropriate  Speech:  fluent English, normal tone, hyperverbal   Mood: \"I have a headache and this place is driving me stir crazy\"  Affect: increased energy level from yesterday, appropriate affect   Thought Content: ruminations on leaving and the cycle of his illness, disorganized   Thought Process: goal directed, ruminative and circumstantial  Sensorium: awake and alert  Cognition: memory grossly intact   Impulse control: good  Insight: recognizes that he is not well but agreeing to take medication, wants to leave   Judgment: Fair         Labs:   No results found for this or any previous visit (from the past 24 hour(s)).     Assessment    Principal Diagnosis:   # Bipolar type 1 vs schizoaffective disorder    Diagnostic Impression:   Salvador Macario is a 31 year old male with a past psychiatric history of bipolar type 1 vs schizoaffective disorder who presented to the ED with aaron and psychosis in the context of medication non - adherance. Significant symptoms include mood lability and psychosis. His last psychiatric hospitalization was on 6/21/22.  He is currently followed at the [facility].  Substance use does not appear to be playing a contributing role in the patient's presentation. The MSE on admission is notable for psychosis and aaron.  His definitive diagnosis is still in evolution; differential " includes bipolar type 1 vs schizoaffective disorder bipolar type.       Prediposing factors for the patient's presentation include family history of bipolar in sister and patient reported history of trauma. Precipitating factors for the patient's presentation are medication non-adherence and patient reported lack of support group. Patient has protective factors in the form of family support, engaged in care, and self awareness about mental illness.     Psychiatric Hospital course:   Salvador Macario was admitted to [facility] as a voluntary patient/ on a 72 hour hold. PTA  and lamictal were held due to patient non adherence and active psychosis.       Medical course   Admission labs notable for:  - CMP normal  - CBC normal  - TSH 0.38 (low normal) with normal T4  - Vitamin D normal, Vitamin B12 normal  - UDS Negative for all tested substances  - COVID negative    Plan     Today's Changes:  - Increase aripiprazole to 10 mg     Psychotropic Medications:  Scheduled Psych Medications:  - Olanzapine 15 mg PO   - Aripiprazole 5 mg PO   - Vitamin D3, 25 mcg PO    PRN Psych Medications  - Hydroxyzine 25 mg PRN Q4H  - Olanzapine 10 mg PO/IM prn Q2H severe agitation/psychosis  - Trazodone 50 mg PRN at bedtime  - propranolol 10mg tid/prn akathisia    Patient will be treated in therapeutic milieu with appropriate individual and group therapies as described.    Legal Status:   Orders Placed This Encounter      Legal status Voluntary      Safety Assessment:   Behavioral Orders   Procedures     Code 1 - Restrict to Unit     Routine Programming     As clinically indicated     Self Injury Precaution     Status 15     Every 15 minutes.     Suicide precautions     Patients on Suicide Precautions should have a Combination Diet ordered that includes a Diet selection(s) AND a Behavioral Tray selection for Safe Tray - with utensils, or Safe Tray - NO utensils         Disposition: Pending stabilization & development of a safe  discharge plan.     This patient was seen and discussed with my attending physician, Dr. Micheal Sanchez.  Mehdi Villegas. MS3    Resident/Fellow Attestation      I, Cornelio Franklin, was present with the medical/[employer]ETT[employer] student who participated in the service and in the documentation of the note.  I have verified the history and personally performed the physical exam and medical decision making.  I agree with the assessment and plan of care as documented in the note.       Cornelio rFanklin MD  PGY1 Psychiatry Resident   Date of Service (when I saw the patient): 02/11/22     Psychiatry Attending Attestation:    This patient has been seen and evaluated by me, Micheal Sanchez.  I have discussed this patient with the psychiatry resident and I agree with the findings and plan in this note.    I have reviewed today's vital signs, medications, labs and imaging.     Micheal Aguilar MD on 2/13/2022 at 8:58 PM

## 2022-02-11 NOTE — PROGRESS NOTES
02/10/22 2200   Groups   Details    (Psychotherapy)   Number of patients attending the group:  5  Group Length:  1 Hours     Group Therapy Type: Psychotherapy     Summary of Group / Topics Discussed:        The  Psychotherapy group goal is to promote insight to positive choice and change. Group processing is within a supportive and safe environment. Patients will process emotions using verbal group and expressive psychotherapy interventions including visual art/writing interventions.     Group interventions support patients by: creative self expression, communication/social skills and supports, learn positive coping mechanisms and self efficacy/empowerment     Modalities to reach these goals include: Narrative psychology, FLOW , Expressive Arts Therapies and Mindfulness practices     Subjective -patient report of mood today-Ok, doing better     Objective/ Intervention- Goal of group and Therapeutic modality utilized- Watercolor painting and discussing coping skills, passions and interests     Group Response- engaged, some redirection for a couple of patients, off topic     Patient Response-Pt enjoyed using fine tip markers and created a landscape with a river and trees. He was very thoughtful with deep thinking about the concepts of reece and awe. He was very intellectual with his replies. He does appear to be responding to internal stimuli as he would be completely engaged in conversation and then laugh randomly during the conversation. He also needed multiple reminders about proper masking. He was complimentary to peers about their art work. He had an intense stare at times as well.     Mitchel Mayes, HAILEY, ATR-BC

## 2022-02-12 PROCEDURE — 250N000013 HC RX MED GY IP 250 OP 250 PS 637: Performed by: EMERGENCY MEDICINE

## 2022-02-12 PROCEDURE — 124N000002 HC R&B MH UMMC

## 2022-02-12 PROCEDURE — 250N000013 HC RX MED GY IP 250 OP 250 PS 637: Performed by: STUDENT IN AN ORGANIZED HEALTH CARE EDUCATION/TRAINING PROGRAM

## 2022-02-12 PROCEDURE — 250N000013 HC RX MED GY IP 250 OP 250 PS 637

## 2022-02-12 RX ADMIN — ARIPIPRAZOLE 10 MG: 10 TABLET ORAL at 07:45

## 2022-02-12 RX ADMIN — NICOTINE POLACRILEX 2 MG: 2 GUM, CHEWING ORAL at 16:07

## 2022-02-12 RX ADMIN — NICOTINE POLACRILEX 2 MG: 2 GUM, CHEWING ORAL at 07:45

## 2022-02-12 RX ADMIN — NICOTINE POLACRILEX 2 MG: 2 GUM, CHEWING ORAL at 11:19

## 2022-02-12 RX ADMIN — NICOTINE POLACRILEX 2 MG: 2 GUM, CHEWING ORAL at 18:08

## 2022-02-12 RX ADMIN — NICOTINE POLACRILEX 2 MG: 2 GUM, CHEWING ORAL at 08:49

## 2022-02-12 RX ADMIN — NICOTINE POLACRILEX 2 MG: 2 GUM, CHEWING ORAL at 17:08

## 2022-02-12 RX ADMIN — NICOTINE POLACRILEX 2 MG: 2 GUM, CHEWING ORAL at 10:03

## 2022-02-12 RX ADMIN — OLANZAPINE 15 MG: 15 TABLET, FILM COATED ORAL at 18:07

## 2022-02-12 RX ADMIN — NICOTINE POLACRILEX 2 MG: 2 GUM, CHEWING ORAL at 13:37

## 2022-02-12 RX ADMIN — NICOTINE 1 PATCH: 14 PATCH, EXTENDED RELEASE TRANSDERMAL at 07:46

## 2022-02-12 RX ADMIN — NICOTINE POLACRILEX 2 MG: 2 GUM, CHEWING ORAL at 14:44

## 2022-02-12 RX ADMIN — Medication 25 MCG: at 07:45

## 2022-02-12 ASSESSMENT — ACTIVITIES OF DAILY LIVING (ADL)
DRESS: INDEPENDENT
LAUNDRY: WITH SUPERVISION
ORAL_HYGIENE: INDEPENDENT
HYGIENE/GROOMING: INDEPENDENT

## 2022-02-12 NOTE — PLAN OF CARE
Pt appeared to sleep 7 hours. No PRNs given or requested. No medical or behavioral events this shift.        Problem: Sleep Disturbance  Goal: Adequate Sleep/Rest  Outcome: No Change

## 2022-02-12 NOTE — PLAN OF CARE
Problem: Sensory Perception Impairment (Psychotic Signs/Symptoms)  Goal: Decreased Sensory Symptoms (Psychotic Signs/Symptoms)  Outcome: Improving   Nursing Assessment    Recent Vitals: B/P:147/83  T:99.3  P:104     General Shift Summary  Pt in milieu most of shift. Somewhat restless. Pt is pleasant and social with writer. Pt endorses still having some manic sx and was able to share their diagnosis. Pt states they are feeling more stable now. Pt denies depression, anxiety, and AH this shift. Pt did not appear to be responding this shift. Pt reports feeling ready for discharge hopefully on Monday. Pt understanding that hospital is for stabilization and is willing to be patient.     Patient is medication compliant and reported no side effects. No PRN medications given this shift other than nicotine.     Hygiene and appetite are good.     Zander Montalvo RN

## 2022-02-12 NOTE — PLAN OF CARE
Pt attended the structured Therapeutic Recreation group, participating in a group activity. Pt participated in group discussion, leisure participation, and social engagement to gain self-esteem, manage behaviors, improve social skills, decrease isolation, and reduce anxiety/depression.   Pt remained focused and engaged throughout group activity.  Pt was sociable and was appropriate with interactions with the others in group. Pt was an active participant, contributing to the clues and descriptions throughout the activity.  At times pt appeared distracted and had an intense look on his face. Occasionally in the group activity, pt seemed to forget the rules of the game, needing some extra cues to refocus back to the activity.

## 2022-02-12 NOTE — PLAN OF CARE
"  Problem: Behavioral Health Plan of Care  Goal: Plan of Care Review  Outcome: Improving  Flowsheets (Taken 2/12/2022 1126)  Plan of Care Reviewed With: patient  Progress: improving  Patient Agreement with Plan of Care: agrees     Problem: Sensory Perception Impairment (Psychotic Signs/Symptoms)  Goal: Decreased Sensory Symptoms (Psychotic Signs/Symptoms)  Outcome: Improving  Flowsheets (Taken 2/12/2022 1126)  Mutually Determined Action Steps (Decreased Sensory Symptoms): adheres to medication regimen     Problem: Behavior Regulation Impairment (Psychotic Signs/Symptoms)  Goal: Improved Behavioral Control (Psychotic Signs/Symptoms)  Outcome: Improving     Problem: Cognitive Impairment (Psychotic Signs/Symptoms)  Goal: Optimal Cognitive Function (Psychotic Signs/Symptoms)  Outcome: Improving  Flowsheets (Taken 2/12/2022 1126)  Mutually Determined Action Steps (Optimal Cognitive Function): remains focused during activity     Salvador OOB for bkft-spent AM watching movie in [employer] with peers-played cards with peer-periodically walking in smith-laughing to self at times while alone, but was not observed talking to self as had been earlier in week-this afternoon watching tennis in lounge-Salvador c/o feeling excess fatigue, \"Even more than I usually feel in the hospital.\"   "

## 2022-02-13 PROCEDURE — 250N000013 HC RX MED GY IP 250 OP 250 PS 637: Performed by: EMERGENCY MEDICINE

## 2022-02-13 PROCEDURE — 250N000013 HC RX MED GY IP 250 OP 250 PS 637: Performed by: STUDENT IN AN ORGANIZED HEALTH CARE EDUCATION/TRAINING PROGRAM

## 2022-02-13 PROCEDURE — 250N000013 HC RX MED GY IP 250 OP 250 PS 637

## 2022-02-13 PROCEDURE — 124N000002 HC R&B MH UMMC

## 2022-02-13 RX ADMIN — OLANZAPINE 15 MG: 15 TABLET, FILM COATED ORAL at 19:21

## 2022-02-13 RX ADMIN — NICOTINE POLACRILEX 2 MG: 2 GUM, CHEWING ORAL at 09:59

## 2022-02-13 RX ADMIN — NICOTINE POLACRILEX 2 MG: 2 GUM, CHEWING ORAL at 14:29

## 2022-02-13 RX ADMIN — NICOTINE POLACRILEX 2 MG: 2 GUM, CHEWING ORAL at 12:09

## 2022-02-13 RX ADMIN — NICOTINE POLACRILEX 2 MG: 2 GUM, CHEWING ORAL at 13:20

## 2022-02-13 RX ADMIN — NICOTINE POLACRILEX 2 MG: 2 GUM, CHEWING ORAL at 16:55

## 2022-02-13 RX ADMIN — ARIPIPRAZOLE 10 MG: 10 TABLET ORAL at 08:46

## 2022-02-13 RX ADMIN — NICOTINE POLACRILEX 2 MG: 2 GUM, CHEWING ORAL at 10:43

## 2022-02-13 RX ADMIN — NICOTINE POLACRILEX 2 MG: 2 GUM, CHEWING ORAL at 07:07

## 2022-02-13 RX ADMIN — NICOTINE POLACRILEX 2 MG: 2 GUM, CHEWING ORAL at 15:36

## 2022-02-13 RX ADMIN — NICOTINE POLACRILEX 2 MG: 2 GUM, CHEWING ORAL at 18:09

## 2022-02-13 RX ADMIN — NICOTINE POLACRILEX 2 MG: 2 GUM, CHEWING ORAL at 08:46

## 2022-02-13 RX ADMIN — Medication 25 MCG: at 08:46

## 2022-02-13 ASSESSMENT — ACTIVITIES OF DAILY LIVING (ADL)
DRESS: INDEPENDENT
LAUNDRY: WITH SUPERVISION
ORAL_HYGIENE: INDEPENDENT
DRESS: INDEPENDENT
HYGIENE/GROOMING: INDEPENDENT
LAUNDRY: WITH SUPERVISION
ORAL_HYGIENE: INDEPENDENT
HYGIENE/GROOMING: INDEPENDENT

## 2022-02-13 NOTE — PLAN OF CARE
Problem: Behavioral Health Plan of Care  Goal: Plan of Care Review  2/13/2022 1740 by Lidia Haines RN  Outcome: Improving  Flowsheets (Taken 2/13/2022 1740)  Plan of Care Reviewed With: patient  Progress: improving  Patient Agreement with Plan of Care: agrees    Problem: Behavior Regulation Impairment (Psychotic Signs/Symptoms)  Goal: Improved Behavioral Control (Psychotic Signs/Symptoms)  2/13/2022 1740 by Lidia Haines RN  Outcome: Improving  Flowsheets (Taken 2/13/2022 1740)  Mutually Determined Action Steps (Improved Behavioral Control): identifies future-oriented goal     Problem: Decreased Participation and Engagement (Psychotic Signs/Symptoms)  Goal: Increased Participation and Engagement (Psychotic Signs/Symptoms)  Outcome: Improving  Flowsheets (Taken 2/13/2022 1740)  Mutually Determined Action Steps (Increased Participation and Engagement):    identifies future-oriented goal    verbalizes personal treatment goal        Problem: Sensory Perception Impairment (Psychotic Signs/Symptoms)  Goal: Decreased Sensory Symptoms (Psychotic Signs/Symptoms)  2/13/2022 1740 by Lidia Haines RN  Outcome: Improving  Flowsheets (Taken 2/13/2022 1740)  Mutually Determined Action Steps (Decreased Sensory Symptoms): adheres to medication regimen    Salvador continues active on unit-social with staff and peers-showered late afternoon 2018 Watching Superbowl in lounge off and on throughout evening-Conversation appropriate, no delusional statements and not observed talking to self-Overheard discussing his dx and need for tx with peer, emphasizing he knows he needs to stay on his medication and how inability to sleep usually first clue becoming manic

## 2022-02-13 NOTE — PLAN OF CARE
"   Patient pacing at beginning of shift... abruptly complaining to writer that hospital was not providing sufficient snacks.  Pt offered Inderol in lieu of elevated BP and pulse: \"Why?  What is that for?\"  After writer explained reason for order, patient asked \"Who ordered that?\".   Pt went on to explain that he was not anxious and that he had been walking in smith and drinking coffee and chewing Nicorette gum.   Patient returned shortly, requesting writer recheck his BP (~130/80).          Patient returned later' asking for prn Zyprexa.  Pt given HS dose.   Pt ate dinner and retired to room.   Pt declined phonecall from family. \"Tell them I'm sleeping.\"  "

## 2022-02-13 NOTE — PLAN OF CARE
"Pt appeared to sleep 7 hours. Given PRN nicotine gum this am. Pt appears paranoid after asking how he is doing this morning stating \"why did you ask that\". Pacing in smith with coffee. No medical or behavioral events this shift.      Problem: Sleep Disturbance  Goal: Adequate Sleep/Rest  Outcome: No Change     "

## 2022-02-13 NOTE — PLAN OF CARE
Problem: Behavioral Health Plan of Care  Goal: Plan of Care Review  Outcome: Improving  Flowsheets (Taken 2/13/2022 1010)  Plan of Care Reviewed With: patient  Progress: improving     Problem: Behavior Regulation Impairment (Psychotic Signs/Symptoms)  Goal: Improved Behavioral Control (Psychotic Signs/Symptoms)  Outcome: Improving  Flowsheets (Taken 2/13/2022 1010)  Mutually Determined Action Steps (Improved Behavioral Control):   identifies future-oriented goal   verbalizes personal treatment goal     Problem: Cognitive Impairment (Psychotic Signs/Symptoms)  Goal: Optimal Cognitive Function (Psychotic Signs/Symptoms)  Outcome: Improving  Flowsheets (Taken 2/13/2022 1010)  Mutually Determined Action Steps (Optimal Cognitive Function):   remains focused during activity   participates in problem resolution     Problem: Sensory Perception Impairment (Psychotic Signs/Symptoms)  Goal: Decreased Sensory Symptoms (Psychotic Signs/Symptoms)  Outcome: Improving  Flowsheets (Taken 2/13/2022 1010)  Mutually Determined Action Steps (Decreased Sensory Symptoms): adheres to medication regimen     Salvador  active on unit throughout morning-Denies hallucinations or unusual thinking, denies SI, denies depressed mood-States he is actually back to his usual self and anxious for discharge-Spoke of living in [facility] [address] and working nearby at [employer] office-requesting return to work letter-Salvador spoke in 1:1 of three things that help a mentally ill person to remain stable, 1. Medication 2. Support System 3. Someone to talk to like a therapist-Went on to say he had a therapist in the past, but no longer does and that he feels seeing a therapist once a week would be very helpful for maintaining his mental health-Salavdor social in lounge with staff and peers-

## 2022-02-14 VITALS
TEMPERATURE: 97.1 F | DIASTOLIC BLOOD PRESSURE: 69 MMHG | HEART RATE: 102 BPM | OXYGEN SATURATION: 97 % | RESPIRATION RATE: 12 BRPM | WEIGHT: 218.2 LBS | SYSTOLIC BLOOD PRESSURE: 124 MMHG

## 2022-02-14 PROCEDURE — 99239 HOSP IP/OBS DSCHRG MGMT >30: CPT | Mod: GC | Performed by: PSYCHIATRY & NEUROLOGY

## 2022-02-14 PROCEDURE — 250N000013 HC RX MED GY IP 250 OP 250 PS 637

## 2022-02-14 PROCEDURE — 250N000013 HC RX MED GY IP 250 OP 250 PS 637: Performed by: STUDENT IN AN ORGANIZED HEALTH CARE EDUCATION/TRAINING PROGRAM

## 2022-02-14 PROCEDURE — 250N000013 HC RX MED GY IP 250 OP 250 PS 637: Performed by: EMERGENCY MEDICINE

## 2022-02-14 RX ORDER — ARIPIPRAZOLE 10 MG/1
10 TABLET ORAL DAILY
Qty: 30 TABLET | Refills: 0 | Status: SHIPPED | OUTPATIENT
Start: 2022-02-15 | End: 2022-03-17

## 2022-02-14 RX ORDER — OLANZAPINE 15 MG/1
15 TABLET ORAL AT BEDTIME
Qty: 30 TABLET | Refills: 0 | Status: SHIPPED | OUTPATIENT
Start: 2022-02-14 | End: 2022-03-16

## 2022-02-14 RX ORDER — VITAMIN B COMPLEX
25 TABLET ORAL DAILY
Qty: 30 TABLET | Refills: 0 | Status: SHIPPED | OUTPATIENT
Start: 2022-02-15 | End: 2022-03-17

## 2022-02-14 RX ADMIN — ARIPIPRAZOLE 10 MG: 10 TABLET ORAL at 08:08

## 2022-02-14 RX ADMIN — NICOTINE POLACRILEX 2 MG: 2 GUM, CHEWING ORAL at 06:41

## 2022-02-14 RX ADMIN — NICOTINE POLACRILEX 2 MG: 2 GUM, CHEWING ORAL at 07:43

## 2022-02-14 RX ADMIN — NICOTINE POLACRILEX 2 MG: 2 GUM, CHEWING ORAL at 11:30

## 2022-02-14 RX ADMIN — NICOTINE POLACRILEX 2 MG: 2 GUM, CHEWING ORAL at 12:53

## 2022-02-14 RX ADMIN — NICOTINE POLACRILEX 2 MG: 2 GUM, CHEWING ORAL at 09:01

## 2022-02-14 RX ADMIN — Medication 25 MCG: at 08:08

## 2022-02-14 RX ADMIN — NICOTINE POLACRILEX 2 MG: 2 GUM, CHEWING ORAL at 14:15

## 2022-02-14 RX ADMIN — NICOTINE POLACRILEX 2 MG: 2 GUM, CHEWING ORAL at 10:01

## 2022-02-14 ASSESSMENT — ACTIVITIES OF DAILY LIVING (ADL)
HYGIENE/GROOMING: INDEPENDENT
LAUNDRY: WITH SUPERVISION
ORAL_HYGIENE: INDEPENDENT
DRESS: INDEPENDENT

## 2022-02-14 NOTE — DISCHARGE INSTRUCTIONS
Behavioral Discharge Planning and Instructions    Summary: You were admitted on 2/5/2022 due to Manic Symptomology.  You were treated by Dr. Sanchez and discharged on 2/14/2022. from [facility] to Home.    Main Diagnosis:   Bipolar I    Health Care Follow-up:   Medication management [phone] Fax:[phone]  [facility]   Dr. Wilma Burt Thursday February 17th @ 9:30- They will email you with a link      Therapist-//Erica Jaramillo PsyD, [facility]    [address] [place] [address]    [phone] (Work)    [phone] (Fax)   Wednesday February 23rd @ 1:00- this will be a phone appointment, Please call to confirm you will be available.        Attend all scheduled appointments with your outpatient providers. Call at least 24 hours in advance if you need to reschedule an appointment to ensure continued access to your outpatient providers.     Major Treatments, Procedures and Findings:  You were provided with: a psychiatric assessment, assessed for medical stability, medication evaluation and/or management and milieu management    Symptoms to Report: feeling more aggressive, increased confusion, losing more sleep, mood getting worse or thoughts of suicide    Early warning signs can include: increased depression or anxiety sleep disturbances increased thoughts or behaviors of suicide or self-harm  increased unusual thinking, such as paranoia or hearing voices    Safety and Wellness:  Take all medicines as directed.  Make no changes unless your doctor suggests them.      Follow treatment recommendations.  Refrain from alcohol and non-prescribed drugs.  If there is a concern for safety, call 911.    Resources:   Crisis Intervention: 938.847.2443 or 851-313-8533 (TTY: 301.172.6502).  Call anytime for help.  [place] [employer] [place] (www.[place].joseph.org): 164.147.5459 or 435-652-9505.  [facility] Crisis Response [phone]  [facility]  "(COPE) Response - Adult [phone]  [facility] Crisis Response - Adult [phone]  Text 4 Life: txt \"LIFE\" to 91583 for immediate support and crisis intervention  Crisis text line: Text \"[place]\" to 743954. Free, confidential, 24/7.    General Medication Instructions:   See your medication sheet(s) for instructions.   Take all medicines as directed.  Make no changes unless your doctor suggests them.   Go to all your doctor visits.  Be sure to have all your required lab tests. This way, your medicines can be refilled on time.  Do not use any drugs not prescribed by your doctor.  Avoid alcohol.    Advance Directives:   Scanned document on file with [facility]? No scanned doc  Is document scanned? Pt states no documents  Honoring Choices Your Rights Handout: Informed and given  Was more information offered? Pt declined    The Treatment team has appreciated the opportunity to work with you. If you have any questions or concerns about your recent admission, you can contact the unit which can receive your call 24 hours a day, 7 days a week. They will be able to get in touch with a Provider if needed. The unit number is [phone].      "

## 2022-02-14 NOTE — PLAN OF CARE
Problem: Behavioral Health Plan of Care  Goal: Plan of Care Review  Outcome: Improving  Flowsheets (Taken 2/14/2022 1228)  Plan of Care Reviewed With: patient  Progress: improving  Patient Agreement with Plan of Care: agrees     Problem: Behavior Regulation Impairment (Psychotic Signs/Symptoms)  Goal: Improved Behavioral Control (Psychotic Signs/Symptoms)  Outcome: Adequate for Discharge  Flowsheets (Taken 2/14/2022 1228)  Mutually Determined Action Steps (Improved Behavioral Control):    identifies future-oriented goal    identifies symptoms triggers     Problem: Decreased Participation and Engagement (Psychotic Signs/Symptoms)  Goal: Increased Participation and Engagement (Psychotic Signs/Symptoms)  Outcome: Adequate for Discharge  Flowsheets (Taken 2/14/2022 1228)  Mutually Determined Action Steps (Increased Participation and Engagement):    identifies future-oriented goal    identifies symptoms triggers    verbalizes gratifying activity     Problem: Sensory Perception Impairment (Psychotic Signs/Symptoms)  Goal: Decreased Sensory Symptoms (Psychotic Signs/Symptoms)  Outcome: Adequate for Discharge  Flowsheets (Taken 2/14/2022 1228)  Mutually Determined Action Steps (Decreased Sensory Symptoms):    adheres to medication regimen    shares insight re: need for meds     Salvador continues to report improved mood-states he feels mentally stable-behavior is organized-conversation is clear and relevant-looking forward to returning to usual work and home routine-states it just feels good to be engaged in his usual mundane daily activities-Salvador declined Covid 19 test-1600 Salvador discharged 1545 post Agustina A, Psych assoc walked him to pharmacy to obtain medications and then to [facility] to obtain [facility] for transport home-Salvador reviewed AVS for medication schedule and outpt tx plans and verbalized understanding and agreement-

## 2022-02-14 NOTE — PLAN OF CARE
Assessment/Intervention/Current Symptoms and Care Coordination    Attended team meeting and reviewed chart notes.    Pt reports feeling much better and has been taking his medications. He is ready for discharge and requested a letter for his employer for his absense and that he is ready to return to work.      Discharge Plan or Goal  Return to home    Barriers to Discharge   Needs to meet with the team      Referral Status  None made        Legal Status  voluntary

## 2022-02-14 NOTE — PROGRESS NOTES
SPIRITUAL HEALTH SERVICES  SPIRITUAL ASSESSMENT Progress Note  [facility] ([facility]) [facility]     REFERRAL SOURCE: I did visit this morning patient Salvador per [facility]  referral. I introduced myself as the unit  and shared all the info about the SHS. Pt appreciated my presence but as he said, he is not interested for  support at this moment. I told him that, in case he change his mind, that I am always available for one to one spiritual and emotional support and he agreed about that.    PLAN: I will remain open to provide spiritual care for the pt as needed.    Mazin Alvarez M.Div. ([place]), M.Th., D.Min., [facility]  Staff   Pager [phone]

## 2022-02-14 NOTE — PLAN OF CARE
Problem: Sleep Disturbance  Goal: Adequate Sleep/Rest  Outcome: Improving     Patient was observed sleeping during rounds and through the rest of the night. No signs of pain nor discomfort. He was awake at past 0630, requested for coffee and nicorette gum. PRN nicorette gum 2 mg was given at 0641. No behavioral concerns noted. Patient slept 7 hours this shift.

## 2022-02-15 ENCOUNTER — PATIENT OUTREACH (OUTPATIENT)
Dept: CARE COORDINATION | Facility: CLINIC | Age: 32
End: 2022-02-15
Payer: COMMERCIAL

## 2022-02-15 DIAGNOSIS — Z71.89 OTHER SPECIFIED COUNSELING: ICD-10-CM

## 2022-02-15 NOTE — PROGRESS NOTES
Clinic Care Coordination Contact  [facility]/Voicemail       Clinical Data: Care Coordinator Outreach  Outreach attempted x 1. Unable to leave a message as voicemail is not set up.  Plan: CC SW will attempt to reach patient again in 1-2 business days.    DELGADO Marie   Social Work Clinic Care Coordinator   [facility]  PH: [phone]  [email or web address]

## 2022-02-16 ENCOUNTER — TELEPHONE (OUTPATIENT)
Dept: PHARMACY | Facility: OTHER | Age: 32
End: 2022-02-16
Payer: COMMERCIAL

## 2022-02-16 NOTE — TELEPHONE ENCOUNTER
MTM referral from: Transitions of Care (recent hospital discharge or ED visit)    MTM referral outreach attempt #2 on February 16, 2022 at 1:42 PM      Outcome: Patient not reachable after several attempts, will route to MTM Pharmacist/Provider as an FYI.  [facility] scheduling number is [phone].  Thank you for the referral.    Srini Patiño, MT coordinator

## 2022-02-16 NOTE — PROGRESS NOTES
Clinic Care Coordination Contact  [facility]/Voicemail       Clinical Data: Care Coordinator Outreach  Outreach attempted x 2. Unable to leave message as voicemail has not been set up yet.  Plan: CC SW will make no further outreaches at this time.    DELGADO Marei   Social Work Clinic Care Coordinator   [facility]  PH: [phone]  [email or web address]